# Patient Record
Sex: FEMALE | Race: WHITE | HISPANIC OR LATINO | Employment: OTHER | URBAN - METROPOLITAN AREA
[De-identification: names, ages, dates, MRNs, and addresses within clinical notes are randomized per-mention and may not be internally consistent; named-entity substitution may affect disease eponyms.]

---

## 2019-09-19 ENCOUNTER — OFFICE VISIT (OUTPATIENT)
Dept: MEDICAL GROUP | Facility: LAB | Age: 64
End: 2019-09-19
Payer: COMMERCIAL

## 2019-09-19 ENCOUNTER — HOSPITAL ENCOUNTER (OUTPATIENT)
Dept: LAB | Facility: MEDICAL CENTER | Age: 64
End: 2019-09-19
Attending: FAMILY MEDICINE
Payer: COMMERCIAL

## 2019-09-19 VITALS
RESPIRATION RATE: 16 BRPM | DIASTOLIC BLOOD PRESSURE: 78 MMHG | HEIGHT: 62 IN | TEMPERATURE: 98.8 F | WEIGHT: 185 LBS | BODY MASS INDEX: 34.04 KG/M2 | HEART RATE: 80 BPM | OXYGEN SATURATION: 98 % | SYSTOLIC BLOOD PRESSURE: 124 MMHG

## 2019-09-19 DIAGNOSIS — Z13.1 SCREENING FOR DIABETES MELLITUS: ICD-10-CM

## 2019-09-19 DIAGNOSIS — K44.9 HIATAL HERNIA: ICD-10-CM

## 2019-09-19 DIAGNOSIS — E03.9 ACQUIRED HYPOTHYROIDISM: ICD-10-CM

## 2019-09-19 DIAGNOSIS — K21.9 GASTROESOPHAGEAL REFLUX DISEASE WITHOUT ESOPHAGITIS: ICD-10-CM

## 2019-09-19 DIAGNOSIS — E78.5 DYSLIPIDEMIA: ICD-10-CM

## 2019-09-19 DIAGNOSIS — L30.9 ECZEMA, UNSPECIFIED TYPE: ICD-10-CM

## 2019-09-19 LAB
ALBUMIN SERPL BCP-MCNC: 4.5 G/DL (ref 3.2–4.9)
ALBUMIN/GLOB SERPL: 1.3 G/DL
ALP SERPL-CCNC: 88 U/L (ref 30–99)
ALT SERPL-CCNC: 21 U/L (ref 2–50)
ANION GAP SERPL CALC-SCNC: 7 MMOL/L (ref 0–11.9)
AST SERPL-CCNC: 21 U/L (ref 12–45)
BILIRUB SERPL-MCNC: 0.4 MG/DL (ref 0.1–1.5)
BUN SERPL-MCNC: 18 MG/DL (ref 8–22)
CALCIUM SERPL-MCNC: 9.6 MG/DL (ref 8.5–10.5)
CHLORIDE SERPL-SCNC: 104 MMOL/L (ref 96–112)
CHOLEST SERPL-MCNC: 217 MG/DL (ref 100–199)
CO2 SERPL-SCNC: 28 MMOL/L (ref 20–33)
CREAT SERPL-MCNC: 0.66 MG/DL (ref 0.5–1.4)
FASTING STATUS PATIENT QL REPORTED: NORMAL
GLOBULIN SER CALC-MCNC: 3.4 G/DL (ref 1.9–3.5)
GLUCOSE SERPL-MCNC: 84 MG/DL (ref 65–99)
HDLC SERPL-MCNC: 68 MG/DL
LDLC SERPL CALC-MCNC: 125 MG/DL
POTASSIUM SERPL-SCNC: 4 MMOL/L (ref 3.6–5.5)
PROT SERPL-MCNC: 7.9 G/DL (ref 6–8.2)
SODIUM SERPL-SCNC: 139 MMOL/L (ref 135–145)
T4 FREE SERPL-MCNC: 1.43 NG/DL (ref 0.53–1.43)
TRIGL SERPL-MCNC: 122 MG/DL (ref 0–149)
TSH SERPL DL<=0.005 MIU/L-ACNC: 1.71 UIU/ML (ref 0.38–5.33)

## 2019-09-19 PROCEDURE — 84443 ASSAY THYROID STIM HORMONE: CPT

## 2019-09-19 PROCEDURE — 84439 ASSAY OF FREE THYROXINE: CPT

## 2019-09-19 PROCEDURE — 99204 OFFICE O/P NEW MOD 45 MIN: CPT | Performed by: FAMILY MEDICINE

## 2019-09-19 PROCEDURE — 80053 COMPREHEN METABOLIC PANEL: CPT

## 2019-09-19 PROCEDURE — 80061 LIPID PANEL: CPT

## 2019-09-19 PROCEDURE — 36415 COLL VENOUS BLD VENIPUNCTURE: CPT

## 2019-09-19 RX ORDER — ATORVASTATIN CALCIUM 10 MG/1
5 TABLET, FILM COATED ORAL
COMMUNITY
Start: 2019-06-24 | End: 2020-09-28

## 2019-09-19 RX ORDER — LEVOTHYROXINE SODIUM 88 UG/1
88 TABLET ORAL DAILY
COMMUNITY
Start: 2019-06-24 | End: 2019-11-11 | Stop reason: SDUPTHER

## 2019-09-19 ASSESSMENT — PATIENT HEALTH QUESTIONNAIRE - PHQ9: CLINICAL INTERPRETATION OF PHQ2 SCORE: 0

## 2019-09-19 NOTE — LETTER
[employer] [facility]  Isabelle Swift M.D.  [address]  [place] [address]  Fax: [phone]   Authorization for Release/Disclosure of   Protected Health Information   Name: OGLDEN AYALA : 1955 SSN: [id number]   Address: [address] [place] [address] [address] [place] [address] Phone:    [phone] (home)    I authorize the entity listed below to release/disclose the PHI below to:   [facility]/Isabelle Swift M.D. and Isabelle Swift M.D.   Provider or Entity Name:     Address   City, State, [facility]   Phone:      Fax:     Reason for request: continuity of care   Information to be released:    [  ] LAST COLONOSCOPY,  including any PATH REPORT and follow-up  [  ] LAST FIT/COLOGUARD RESULT [  ] LAST DEXA  [  ] LAST MAMMOGRAM  [  ] LAST PAP  [  ] LAST LABS [  ] RETINA EXAM REPORT  [  ] IMMUNIZATION RECORDS  [  ] Release all info      [  ] Check here and initial the line next to each item to release ALL health information INCLUDING  _____ Care and treatment for drug and / or alcohol abuse  _____ HIV testing, infection status, or AIDS  _____ Genetic Testing    DATES OF SERVICE OR TIME PERIOD TO BE DISCLOSED: _____________  I understand and acknowledge that:  * This Authorization may be revoked at any time by you in writing, except if your health information has already been used or disclosed.  * Your health information that will be used or disclosed as a result of you signing this authorization could be re-disclosed by the recipient. If this occurs, your re-disclosed health information may no longer be protected by State or Federal laws.  * You may refuse to sign this Authorization. Your refusal will not affect your ability to obtain treatment.  * This Authorization becomes effective upon signing and will  on (date) __________.      If no date is indicated, this Authorization will  one (1) year from the signature date.    Name: Golden Aayla    Signature:   Date:     2019       PLEASE FAX REQUESTED  RECORDS BACK TO: [phone]

## 2019-09-19 NOTE — ASSESSMENT & PLAN NOTE
Dyslipidemia Chronic condition. Current treatments: diet/exercise/medicines. She is taking atorvastatin 5 mg 5 days a week as directed. Current side effects: none.

## 2019-09-23 ENCOUNTER — HOSPITAL ENCOUNTER (OUTPATIENT)
Dept: RADIOLOGY | Facility: MEDICAL CENTER | Age: 64
End: 2019-09-23

## 2019-09-25 NOTE — PROGRESS NOTES
Chief Complaint   Patient presents with   • Establish Care         Ana Maria Ayala is a 63 y.o. female here to establish care and for evaluation and management of:        HPI:    Dyslipidemia  Dyslipidemia Chronic condition. Current treatments: diet/exercise/medicines. She is taking atorvastatin 5 mg 5 days a week as directed. Current side effects: none.       Acquired hypothyroidism  Stable. Currently taking levothyroxine 88 mcg daily as prescribed.  Denies palpitations, skin changes, temperature intolerance, or changes in bowel habits        Eczema  Has a history of eczema but denies any current flares.  She moisturizes daily.      GERD (gastroesophageal reflux disease)  Patient has a history of GERD and hiatal hernia that she manages with dietary modifications.      Allergies   Allergen Reactions   • Codeine    • Morphine    • Septra [Sulfamethoxazole W-Trimethoprim]        Current medicines (including changes today)  Current Outpatient Medications   Medication Sig Dispense Refill   • atorvastatin (LIPITOR) 10 MG Tab Take 10 mg by mouth every day.     • levothyroxine (SYNTHROID) 88 MCG Tab Take 88 mcg by mouth every day.       No current facility-administered medications for this visit.      She  has a past medical history of Allergy, Arthritis, Eczema, GERD (gastroesophageal reflux disease), Hiatal hernia, Hyperlipidemia, Sinusitis, and Thyroid disease.  She  has a past surgical history that includes tonsillectomy and adenoidectomy; tubal coagulation laparoscopic bilateral; turbinate reduction (2014); and dental surgery.  Social History     Tobacco Use   • Smoking status: Never Smoker   • Smokeless tobacco: Never Used   Substance Use Topics   • Alcohol use: Not Currently     Comment: once in a bonny moon   • Drug use: Not Currently     Social History     Social History Narrative   • Not on file     Family History   Problem Relation Age of Onset   • Cancer Mother 44        breast   • Heart Disease Mother    •  "Hypertension Mother    • Lung Disease Mother    • Hyperlipidemia Mother    • Lung Disease Father    • Heart Disease Maternal Grandmother    • Diabetes Neg Hx      Family Status   Relation Name Status   • Mo 89    • Fa 88    • MGMo  (Not Specified)   • Neg Hx  (Not Specified)         ROS  No fever or chills.  No nausea or vomiting.  No chest pain or palpitations.  No cough or SOB.  No pain with urination or hematuria.  No black or bloody stools.  All other systems reviewed and are negative     Objective:     /78 (BP Location: Right arm, Patient Position: Sitting, BP Cuff Size: Adult)   Pulse 80   Temp 37.1 °C (98.8 °F) (Temporal)   Resp 16   Ht 1.575 m (5' 2\")   Wt 83.9 kg (185 lb)   SpO2 98%  Body mass index is 33.84 kg/m².  Physical Exam:      Well developed, well nourished.  Alert, oriented in no acute distress.  Psych: Eye contact is good, speech goal directed, affect calm  Eyes: conjunctiva non-injected, sclera non-icteric.  Neck Supple.  No adenopathy or masses in the neck or supraclavicular regions. No thyromegaly  Lungs: clear to auscultation bilaterally with good excursion. No wheezes or rhonchi  CV: regular rate and rhythm. No murmur  Abdomen: soft, nontender, no masses or organomegaly.  No rebound or guarding  Ext: no edema, color normal, vascularity normal, temperature normal      Assessment and Plan:   The following treatment plan was discussed    1. Hiatal hernia  Controlling with dietary modifications.  Encourage weight loss for better control.    2. Gastroesophageal reflux disease without esophagitis  Controlling with dietary modifications.  Encourage weight loss for better control.    3. Acquired hypothyroidism  Check thyroid labs and adjust levothyroxine dose as needed  - TSH; Future  - FREE THYROXINE; Future    4. Dyslipidemia  Check lipids and adjust atorvastatin as needed  - Comp Metabolic Panel; Future  - Lipid Profile; Future    5. Eczema, unspecified type  Patient " education materials given.  No current flares    6. Screening for diabetes mellitus  Screening labs ordered.  Await results for counseling.    - Comp Metabolic Panel; Future      Records requested.    Any change or worsening of signs or symptoms, patient encouraged to follow-up or report to the emergency room for further evaluation. Patient understands and agrees.    Followup: Return in about 6 months (around 3/19/2020).

## 2019-09-25 NOTE — ASSESSMENT & PLAN NOTE
Stable. Currently taking levothyroxine 88 mcg daily as prescribed.  Denies palpitations, skin changes, temperature intolerance, or changes in bowel habits

## 2019-10-01 ENCOUNTER — TELEPHONE (OUTPATIENT)
Dept: MEDICAL GROUP | Facility: LAB | Age: 64
End: 2019-10-01

## 2019-10-01 NOTE — TELEPHONE ENCOUNTER
· [employer] CD paperwork received fromDr. Terri Atkinson's office was sent to HIM to upload in patient's chart through inner office mail    · All appropriate fields completed by Medical Assistant: Yes

## 2019-10-03 ENCOUNTER — HOSPITAL ENCOUNTER (OUTPATIENT)
Dept: RADIOLOGY | Facility: MEDICAL CENTER | Age: 64
End: 2019-10-03
Attending: FAMILY MEDICINE
Payer: COMMERCIAL

## 2019-10-03 DIAGNOSIS — Z12.31 VISIT FOR SCREENING MAMMOGRAM: ICD-10-CM

## 2019-10-03 PROCEDURE — 77067 SCR MAMMO BI INCL CAD: CPT

## 2019-10-05 ENCOUNTER — TELEPHONE (OUTPATIENT)
Dept: HEALTH INFORMATION MANAGEMENT | Facility: OTHER | Age: 64
End: 2019-10-05

## 2019-10-05 DIAGNOSIS — Z12.11 SCREENING FOR COLON CANCER: ICD-10-CM

## 2019-10-05 NOTE — TELEPHONE ENCOUNTER
Good morning Dr. Swift,    This patient is overdue for health maintenance topics that need orders so she can complete them.     Please review the pended orders in  to sign or make adjustments as appropriate.    Close the encounter when complete.  If declining these orders, please document a reason and route to the Outreach MA pool (p AMB  Outreach).  I will call the patient to cancel the appointment.    Thank you

## 2019-10-08 ENCOUNTER — OFFICE VISIT (OUTPATIENT)
Dept: MEDICAL GROUP | Facility: LAB | Age: 64
End: 2019-10-08
Payer: COMMERCIAL

## 2019-10-08 ENCOUNTER — HOSPITAL ENCOUNTER (OUTPATIENT)
Facility: MEDICAL CENTER | Age: 64
End: 2019-10-08
Attending: FAMILY MEDICINE
Payer: COMMERCIAL

## 2019-10-08 VITALS
SYSTOLIC BLOOD PRESSURE: 118 MMHG | RESPIRATION RATE: 17 BRPM | OXYGEN SATURATION: 97 % | BODY MASS INDEX: 34.04 KG/M2 | HEIGHT: 62 IN | HEART RATE: 68 BPM | TEMPERATURE: 98.6 F | DIASTOLIC BLOOD PRESSURE: 72 MMHG | WEIGHT: 185 LBS

## 2019-10-08 DIAGNOSIS — Z01.419 WELL WOMAN EXAM WITH ROUTINE GYNECOLOGICAL EXAM: ICD-10-CM

## 2019-10-08 DIAGNOSIS — Z12.4 SCREENING FOR CERVICAL CANCER: ICD-10-CM

## 2019-10-08 DIAGNOSIS — Z23 NEED FOR VACCINATION: ICD-10-CM

## 2019-10-08 PROCEDURE — 90471 IMMUNIZATION ADMIN: CPT | Performed by: FAMILY MEDICINE

## 2019-10-08 PROCEDURE — 87624 HPV HI-RISK TYP POOLED RSLT: CPT

## 2019-10-08 PROCEDURE — 88175 CYTOPATH C/V AUTO FLUID REDO: CPT

## 2019-10-08 PROCEDURE — 90715 TDAP VACCINE 7 YRS/> IM: CPT | Performed by: FAMILY MEDICINE

## 2019-10-08 PROCEDURE — 99396 PREV VISIT EST AGE 40-64: CPT | Mod: 25 | Performed by: FAMILY MEDICINE

## 2019-10-08 NOTE — PATIENT INSTRUCTIONS
Pap Test  A Pap test is a procedure done in a clinic office to evaluate cells that are on the surface of the cervix. The cervix is the lower portion of the uterus and upper portion of the vagina. For some women, the cervical region has the potential to form cancer. With consistent evaluations by your caregiver, this type of cancer can be prevented.   If a Pap test is abnormal, it is most often a result of a previous exposure to human papillomavirus (HPV). HPV is a virus that can infect the cells of the cervix and cause dysplasia. Dysplasia is where the cells no longer look normal. If a woman has been diagnosed with high-grade or severe dysplasia, they are at higher risk of developing cervical cancer. People diagnosed with low-grade dysplasia should still be seen by their caregiver because there is a small chance that low-grade dysplasia could develop into cancer.   LET YOUR CAREGIVER KNOW ABOUT:  · Recent sexually transmitted infection (STI) you have had.  · Any new sex partners you have had.  · History of previous abnormal Pap tests results.  · History of previous cervical procedures you have had (colposcopy, biopsy, loop electrosurgical excision procedure [LEEP]).  · Concerns you have had regarding unusual vaginal discharge.  · History of pelvic pain.  · Your use of birth control.  BEFORE THE PROCEDURE  · Ask your caregiver when to schedule your Pap test. It is best not to be on your period if your caregiver uses a wooden spatula to collect cells or applies cells to a glass slide. Newer techniques are not so sensitive to the timing of a menstrual cycle.  · Do not douche or have sexual intercourse for 24 hours before the test.    · Do not use vaginal creams or tampons for 24 hours before the test.    · Empty your bladder just before the test to lessen any discomfort.    PROCEDURE  You will lie on an exam table with your feet in stirrups. A warm metal or plastic instrument (speculum) is placed in your vagina. This  instrument allows your caregiver to see the inside of your vagina and look at your cervix. A small, plastic brush or wooden spatula is then used to collect cervical cells. These cells are placed in a lab specimen container. The cells are looked at under a microscope. A specialist will determine if the cells are normal.   AFTER THE PROCEDURE  Make sure to get your test results. If your results come back abnormal, you may need further testing.   Document Released: 03/09/2004 Document Revised: 03/11/2013 Document Reviewed: 12/13/2012  [employer]® Patient Information ©2014 [employer].

## 2019-10-08 NOTE — PROGRESS NOTES
SUBJECTIVE:   Chief Complaint   Patient presents with   • Gynecologic Exam       63 y.o. female for annual routine gynecologic exam    OB History    Para Term  AB Living   1 1 0 0 0 1   SAB TAB Ectopic Molar Multiple Live Births   0 0 0 0 0 0   Obstetric Comments   32 yo son      Social History     Substance and Sexual Activity   Sexual Activity Yes   • Partners: Male       Last Pap: many years ago   HPV testing negative  H/O Abnormal Pap  No significant bloating/fluid retention, pelvic pain, or dyspareunia. No vaginal discharge   She does not perform regular self breast exams.  No breast tenderness, mass, nipple discharge, changes in size or contour, or abnormal cyclic discomfort.        ROS:    No urinary tract symptoms, no incontinence.   No abdominal pain, change in bowel habits, black or bloody stools.    No unusual headaches, no visual changes, menstrual migraines   No prolonged cough. No dyspnea or chest pain on exertion.  No depression, labile mood, anxiety ,libido changes, insomnia.  No new/concerning skin lesions,      Social History     Tobacco Use   • Smoking status: Never Smoker   • Smokeless tobacco: Never Used   Substance Use Topics   • Alcohol use: Not Currently     Comment: once in a bonny moon   • Drug use: Not Currently       Patient Active Problem List    Diagnosis Date Noted   • Acquired hypothyroidism 2019   • Dyslipidemia 2019   • Hiatal hernia    • GERD (gastroesophageal reflux disease)    • Eczema        Past Medical History:   Diagnosis Date   • Allergy    • Arthritis    • Eczema    • GERD (gastroesophageal reflux disease)    • Hiatal hernia    • Hyperlipidemia    • Sinusitis    • Thyroid disease      Past Surgical History:   Procedure Laterality Date   • TURBINATE REDUCTION     • DENTAL SURGERY     • TONSILLECTOMY AND ADENOIDECTOMY     • TUBAL COAGULATION LAPAROSCOPIC BILATERAL           Family History   Problem Relation Age of Onset   • Cancer Mother 44         "breast   • Heart Disease Mother    • Hypertension Mother    • Lung Disease Mother    • Hyperlipidemia Mother    • Lung Disease Father    • Heart Disease Maternal Grandmother    • Diabetes Neg Hx      Family Status   Relation Name Status   • Mo 89    • Fa 88    • MGMo  (Not Specified)   • Neg Hx  (Not Specified)         Current medicines (including changes today)  Current Outpatient Medications   Medication Sig Dispense Refill   • atorvastatin (LIPITOR) 10 MG Tab Take 5 mg by mouth every Mon, Wed, Fri, Sat, and Sun at 6 PM.     • levothyroxine (SYNTHROID) 88 MCG Tab Take 88 mcg by mouth every day.       No current facility-administered medications for this visit.            Allergies: Codeine; Morphine; and Septra [sulfamethoxazole w-trimethoprim]     Preventive Care:  Health Maintenance Topics with due status: Overdue       Topic Date Due    HEPATITIS C SCREENING 1956    PAP SMEAR 1977    COLONOSCOPY 2006    IMM DTaP/Tdap/Td Vaccine 2013    IMM INFLUENZA 2019       OBJECTIVE:   /72 (BP Location: Right arm, Patient Position: Sitting, BP Cuff Size: Adult)   Pulse 68   Temp 37 °C (98.6 °F) (Temporal)   Resp 17   Ht 1.575 m (5' 2\")   Wt 83.9 kg (185 lb)   LMP  (LMP Unknown)   SpO2 97%   BMI 33.84 kg/m²   Body mass index is 33.84 kg/m².      Gen: Well developed, well nourished in no acute distress.   Skin: Pink, warm, and dry. No rashes  Eyes: conjunctiva non-injected, sclera non-icteric. EOMs intact.   Nasal mucosa without edema nor erythema. No facial tenderness  Ears:Pinna normal. TM pearly gray.   Nose: Nares patent.  No discharge.  Oral mucous membranes pink and moist with no lesions.  Neck: Supple, trachea midline. No adenopathy or masses in the neck or supraclavicular regions. No thyromegaly.  Lungs: Effort is normal. Clear to auscultation bilaterally with good excursion.  CV: regular rate and rhythm. No murmur.  Abdomen: soft, nontender, + BS. No HSM.  " No CVAT  Ext: no edema, color normal, vascularity normal, temperature normal  Alert and oriented Eye contact is good, speech goal directed, affect calm     Breast Exam: Performed with instruction during examination. No axillary lymphadenopathy, no skin changes, no dominant masses. No nipple retraction  Pelvic Exam:  Normal external genitalia with no lesions. Normal vaginal mucosa with normal rugation and no discharge. Cervix with no visible lesions. No cervical motion tenderness. Uterus is normal sized with no masses. No adnexal tenderness or enlargement appreciated. Pap is obtained and the specimen was sent to lab      <ASSESSMENT and PLAN>  1. Well woman exam with routine gynecological exam  Routine anticipatory guidance.  Health counseling as below.    2. Screening for cervical cancer  Pap obtained.  - THINPREP PAP WITH HPV; Future    3. Need for vaccination  Updated  - Tdap Vaccine =>8YO IM      Discussed  breast self exam, menopause, osteoporosis, adequate intake of calcium and vitamin D, diet and exercise   .   Return in about 1 year (around 10/8/2020).

## 2019-10-09 ENCOUNTER — TELEPHONE (OUTPATIENT)
Dept: MEDICAL GROUP | Facility: LAB | Age: 64
End: 2019-10-09

## 2019-10-09 ENCOUNTER — NON-PROVIDER VISIT (OUTPATIENT)
Dept: MEDICAL GROUP | Facility: LAB | Age: 64
End: 2019-10-09
Payer: COMMERCIAL

## 2019-10-09 DIAGNOSIS — Z12.4 SCREENING FOR CERVICAL CANCER: ICD-10-CM

## 2019-10-09 DIAGNOSIS — Z23 NEED FOR VACCINATION: ICD-10-CM

## 2019-10-09 PROCEDURE — 90686 IIV4 VACC NO PRSV 0.5 ML IM: CPT | Performed by: FAMILY MEDICINE

## 2019-10-09 PROCEDURE — 90471 IMMUNIZATION ADMIN: CPT | Performed by: FAMILY MEDICINE

## 2019-10-09 NOTE — TELEPHONE ENCOUNTER
1. Caller Name: Ana Maria Ayala   Call Back Number: [phone] (home)         Patient approves a detailed voicemail message: N\A    Called and LVM informing patient we have the flu vaccine.

## 2019-10-09 NOTE — PROGRESS NOTES
"Ana Maria Ayala is a 63 y.o. female here for a non-provider visit for:   FLU    Reason for immunization: Annual Flu Vaccine  Immunization records indicate need for vaccine: Yes, confirmed with Epic  Minimum interval has been met for this vaccine: Yes  ABN completed: Not Indicated    Order and dose verified by: genet LAI Dated 8/15/19 was given to patient: Yes  All IAC Questionnaire questions were answered \"No.\"    Patient tolerated injection and no adverse effects were observed or reported: Yes    Pt scheduled for next dose in series: No  "

## 2019-10-10 LAB
CYTOLOGY REG CYTOL: NORMAL
HPV HR 12 DNA CVX QL NAA+PROBE: NEGATIVE
HPV16 DNA SPEC QL NAA+PROBE: NEGATIVE
HPV18 DNA SPEC QL NAA+PROBE: NEGATIVE
SPECIMEN SOURCE: NORMAL

## 2019-11-10 ENCOUNTER — PATIENT MESSAGE (OUTPATIENT)
Dept: MEDICAL GROUP | Facility: LAB | Age: 64
End: 2019-11-10

## 2019-11-11 RX ORDER — LEVOTHYROXINE SODIUM 88 UG/1
88 TABLET ORAL DAILY
Qty: 90 TAB | Refills: 0 | Status: SHIPPED | OUTPATIENT
Start: 2019-11-11 | End: 2020-02-11

## 2019-11-11 NOTE — TELEPHONE ENCOUNTER
From: Ana Maria Ayala  To: Isabelle Swift M.D.  Sent: 11/10/2019 8:18 AM PST  Subject: Prescription Question    I need a refill for my Levothyroxine 88 mcg and I am out of refills. I called in to [facility], but realized that they only have my old doctor's name for a refill. It is the [facility] at [address] [place] [address].

## 2019-11-11 NOTE — PATIENT COMMUNICATION
Was the patient seen in the last year in this department? Yes  LOV: 10/08/2019  Does patient have an active prescription for medications requested? Yes    Received Request Via: Patient

## 2020-02-25 ENCOUNTER — PATIENT MESSAGE (OUTPATIENT)
Dept: MEDICAL GROUP | Facility: LAB | Age: 65
End: 2020-02-25

## 2020-02-25 DIAGNOSIS — R13.13 PHARYNGEAL DYSPHAGIA: ICD-10-CM

## 2020-07-16 ENCOUNTER — OFFICE VISIT (OUTPATIENT)
Dept: MEDICAL GROUP | Facility: LAB | Age: 65
End: 2020-07-16
Payer: COMMERCIAL

## 2020-07-16 VITALS
HEIGHT: 62 IN | WEIGHT: 189 LBS | OXYGEN SATURATION: 80 % | TEMPERATURE: 98.6 F | HEART RATE: 79 BPM | BODY MASS INDEX: 34.78 KG/M2 | SYSTOLIC BLOOD PRESSURE: 120 MMHG | RESPIRATION RATE: 16 BRPM | DIASTOLIC BLOOD PRESSURE: 70 MMHG

## 2020-07-16 DIAGNOSIS — M75.22 BICEPS TENDONITIS OF BOTH SHOULDERS: ICD-10-CM

## 2020-07-16 DIAGNOSIS — M75.21 BICEPS TENDONITIS OF BOTH SHOULDERS: ICD-10-CM

## 2020-07-16 PROBLEM — M25.512 CHRONIC PAIN OF BOTH SHOULDERS: Status: ACTIVE | Noted: 2020-07-16

## 2020-07-16 PROBLEM — M25.511 CHRONIC PAIN OF BOTH SHOULDERS: Status: ACTIVE | Noted: 2020-07-16

## 2020-07-16 PROBLEM — G89.29 CHRONIC PAIN OF BOTH SHOULDERS: Status: ACTIVE | Noted: 2020-07-16

## 2020-07-16 PROCEDURE — 99214 OFFICE O/P EST MOD 30 MIN: CPT | Performed by: FAMILY MEDICINE

## 2020-07-16 ASSESSMENT — PATIENT HEALTH QUESTIONNAIRE - PHQ9: CLINICAL INTERPRETATION OF PHQ2 SCORE: 0

## 2020-07-16 NOTE — PATIENT INSTRUCTIONS
Proximal Biceps Tendon Tear Rehab  Ask your health care provider which exercises are safe for you. Do exercises exactly as told by your health care provider and adjust them as directed. It is normal to feel mild stretching, pulling, tightness, or discomfort as you do these exercises. Stop right away if you feel sudden pain or your pain gets worse. Do not begin these exercises until told by your health care provider.  Stretching and range-of-motion exercises  These exercises warm up your muscles and joints and improve the movement and flexibility of your arm and shoulder. The exercises also help to relieve pain and stiffness.  Shoulder pendulum    1. Stand near a table or counter that you can hold onto for balance.  2. Bend forward at the waist and let your left / right arm hang straight down. Use your other arm to support you and help you stay balanced.  3. Relax your left / right arm and shoulder muscles, and move your hips and your trunk so your left / right arm swings freely. Your arm should swing because of the motion of your body, not because you are using your arm or shoulder muscles.  4. Keep moving your hips and trunk so your arm swings in the following directions, as told by your health care provider:  ? Side to side.  ? Forward and backward.  ? In clockwise and counterclockwise circles.  Repeat __________ times. Complete this exercise __________ times a day.  Single-arm shoulder flexion, assisted  You will need a stick, broom handle, or similar object to help (assist) in doing this exercise.  1. Lie on your back. Grasp the bottom of the stick in your left / right hand.  2. Using the stick, raise your arm overhead until you feel a gentle stretch (flexion).  3. Hold this position for _________ seconds.  4. Return to the starting position.  Repeat __________ times. Complete this exercise __________ times a day.  Double-arm shoulder flexion, assisted  You will need a stick, broom handle, or similar object to  help (assist) in doing this exercise.  1. Lie on your back. Hold the stick in both hands, with your hands shoulder-width apart.  2. Raise both hands over your head, stopping when you feel a gentle stretch in your left / right shoulder (flexion).  3. Hold this position for _________ seconds.  4. Return to the starting position.  Repeat __________ times. Complete this exercise __________ times a day.  Shoulder flexion, assisted    1. Stand facing a wall. Put your left / right palm on the wall.  2. Slowly move your left / right hand up the wall (flexion). Stop when you feel a gentle stretch in your shoulder, or when you reach the angle that is recommended by your health care provider.  ? Use your other hand to help raise your arm, if needed (assisted).  ? As your hand gets higher, you may need to step closer to the wall.  ? Avoid shrugging or lifting your shoulder up as you raise your arm. To do this, keep your shoulder blade tucked down toward your spine.  3. Hold this position for __________ seconds.  4. Slowly return to the starting position. Use your other arm to help, if needed.  Repeat __________ times. Complete this exercise __________ times a day.  Shoulder abduction, assisted  You will need a stick, broom handle, or similar object to help you (assist) in doing this exercise.  1. Lie on your back. Grasp the bottom of the stick in your left / right hand. Your thumb should be pointing upward.  2. Using the stick, slowly push your arm away from your side (abduction) and over your head until you feel a gentle stretch.  3. Hold this position for __________ seconds.  4. Return to the starting position.  Repeat __________ times. Complete this exercise __________ times a day.  Elbow extension stretch  You may do this exercise in two ways:  · Lie on your back and rest your elbow off the edge of the bed.  · Sit at a table with your upper arm supported as if you are lying down, and let your elbow rest off the  table.  1. Let the weight of your hand, wrist, and forearm straighten your elbow (extension) until you feel a gentle stretch.  2. If approved by your health care provider, you may hold a _________ weight in your hand to help you stretch farther.  3. Hold this position for _________ seconds.  4. Slowly return to the starting position.  Repeat __________ times. Complete this exercise __________ times a day.  Strengthening exercises  These exercises build strength and endurance in your arm and shoulder. Endurance is the ability to use your muscles for a long time, even after they get tired. Do not start these exercises until instructed to do so by your health care provider.  Forearm rotation    1. Sit with your left / right forearm supported on a table. Your elbow should be at waist height.  2. If directed, hold a hammer in your left / right hand.  3. Rest your hand over the edge of the table so your palm faces down.  4. Without moving your left / right elbow, slowly rotate your hand so your palm faces up toward the ceiling.  ? If you are holding a hammer, begin by holding the hammer near the head. When this exercise gets easier for you, hold the hammer farther down the handle.  5. Hold this position for __________ seconds.  6. Slowly return to the starting position.  Repeat __________ times. Complete this exercise __________ times a day.  Isometric elbow flexion  1. Stand or sit with your left / right arm at waist height. Your palm should face in, toward your body.  2. Place your other hand on top of your left / right forearm. Gently push down while you resist with your left / right arm (isometric flexion).  ? Use about 50% effort with both arms. You may be instructed to use more and more effort with your arms each week.  ? Try not to let your left / right elbow move during the exercise.  3. Hold this position for _________ seconds.  4. Let your muscles relax completely before you repeat this exercise.  Repeat  __________ times. Complete this exercise __________ times a day.  Biceps curls  You may use a weight or an exercise band to do this exercise.  1. Sit on a stable chair without armrests, or stand up.  2. Hold a __________ weight in your left / right hand, or hold an exercise band with both hands. Your palms should face up toward the ceiling at the starting position.  3. Bend your left / right elbow and move your hand up toward your shoulder. Keep your other arm straight down, in the starting position.  4. Hold this position for __________ seconds.  5. Slowly return to the starting position.  Repeat __________ times. Complete this exercise __________ times a day.  Hammer curls  You may use a weight or an exercise band to do this exercise.  1. Sit on a stable chair without armrests, or stand up.  2. Hold a __________ weight in your left / right hand, or hold an exercise band with both hands. Your palms should face each other at the starting position.  3. Keeping your other arm straight, bend your left / right elbow and move your hand up toward your shoulder. Keep your elbow at your side while you bend it.  4. Slowly return to the starting position.  Repeat __________ times. Complete this exercise __________ times a day.  Isometric shoulder flexion  1. Stand facing a wall. Make a light fist and put your left / right hand on the wall, thumb toward the wall.  2. Push against the wall, trying to raise your arm straight in front of you (flexion). The wall will stop any motion (isometric flexion).  3. Hold this position for __________ seconds.  4. Let your arm muscles relax completely before you repeat this exercise.  Repeat __________ times. Complete this exercise __________ times a day.  Shoulder flexion  You may use a weight or an exercise band to do this exercise.  1. Stand holding a _________ weight in your left / right hand, or hold an exercise band in both hands.  2. Slowly raise your left / right arm overhead as far  as you can (flexion) without feeling pain.  ? Do not allow your shoulder to lift up while doing this exercise.  ? Keep your thumb pointing up to the ceiling.  3. Hold this position for __________ seconds.  4. Slowly return to the starting position.  Repeat __________ times. Complete this exercise __________ times a day.  Scapular retraction  Scapular retraction is the process of pulling the shoulder blades (scapulae) toward each other, and toward the spine. You will need an exercise band to do this exercise.  1. Sit in a stable chair without armrests, or stand up.  2. Secure an exercise band to a stable object in front of you so the band is at shoulder height.  3. Hold one end of the exercise band in each hand.  4. Squeeze your shoulder blades together and move your elbows slightly behind you (retraction). Do not shrug your shoulders upward while you do this.  5. Hold this position for __________ seconds.  6. Slowly return to the starting position.  Repeat __________ times. Complete this exercise __________ times a day.  Scapular protraction, supine  Scapular protraction is the process of moving your shoulder blades away from each other, and away from the spine, while you lie on your back (supine position).  1. Lie on your back on a firm surface. Hold a __________ weight in your left / right hand.  2. Raise your left / right arm straight into the air so your hand is directly above your shoulder joint.  3. Push the weight into the air so your shoulder (scapula) lifts off the surface that you are lying on. Think of trying to punch the ceiling by only moving your scapula forward (protraction). Do not move your head, neck, or back.  4. Hold this position for __________ seconds.  5. Slowly return to the starting position.  Repeat __________ times. Complete this exercise __________ times a day.  This information is not intended to replace advice given to you by your health care provider. Make sure you discuss any  questions you have with your health care provider.  Document Released: 12/18/2006 Document Revised: 04/07/2020 Document Reviewed: 12/30/2019  Elsevier Patient Education © 2020 [employer]  Proximal Biceps Tendinitis and Tenosynovitis    The proximal biceps tendon is a strong cord of tissue that connects the biceps muscle on the front of the upper arm to the shoulder blade. Tendinitis is inflammation of a tendon. Tenosynovitis is inflammation of the lining around the tendon (tendon sheath). These conditions often occur at the same time, and they can interfere with the ability to bend the elbow and turn the palm of the hand up.  Proximal biceps tendinitis and tenosynovitis are usually caused by overusing the shoulder joint and the biceps muscle. These conditions usually heal within 6 weeks. Proximal biceps tendinitis may include a grade 1 or grade 2 strain of the tendon.  · A grade 1 strain is mild, and it involves a slight pull of the tendon without any stretching or noticeable tearing of the tendon. There is usually no loss of biceps muscle strength.  · A grade 2 strain is moderate, and it involves a small tear in the tendon. The tendon is stretched, and biceps strength is usually decreased.  What are the causes?  This condition may be caused by:  · A sudden increase in frequency or intensity of activity that involves the shoulder and the biceps muscle.  · Overuse of the biceps muscle. This can happen when you do the same movements over and over, such as:  ? Turning the palm of the hand up.  ? Forceful straightening (hyperextension) of the elbow.  ? Bending the elbow.  · A direct, forceful hit or injury to the elbow. This is rare.  What increases the risk?  The following factors may make you more likely to develop this condition:  · Playing contact sports.  · Playing sports that involve throwing and overhead movements, including racket sports, gymnastics, weight lifting, or bodybuilding.  · Doing physical  labor.  · Having poor strength and flexibility of the arm and shoulder.  What are the signs or symptoms?  Symptoms of this condition may include:  · Pain and inflammation in the front of the shoulder.  · A feeling of warmth in the front of the shoulder.  · Limited range of motion of the shoulder and the elbow.  · A crackling sound (crepitation) when you move or touch the shoulder or the upper arm.  In some cases, symptoms may return after treatment, and they may be long-lasting (chronic).  How is this diagnosed?  This condition is diagnosed based on:  · Your symptoms.  · Your medical history.  · Physical exam.  · X-ray or MRI, if needed.  How is this treated?  Treatment for this condition depends on the severity of your injury. It may include:  · Resting the injured arm.  · Icing the injured area.  · Doing physical therapy.  Your health care provider may also use:  · Medicines to treat pain and inflammation.  · Sound waves to treat the injured muscle (ultrasound therapy).  · Medicines that are injected to the muscle (corticosteroids).  · Medicines that numb the area (local anesthetics).  · Surgery. This is done if other treatments have not worked.  Follow these instructions at home:  Managing pain, stiffness, and swelling         · If directed, put ice on the injured area.  ? Put ice in a plastic bag.  ? Place a towel between your skin and the bag.  ? Leave the ice on for 20 minutes, 2-3 times a day.  · If directed, apply heat to the affected area before you exercise. Use the heat source that your health care provider recommends, such as a moist heat pack or a heating pad.  ? Place a towel between your skin and the heat source.  ? Leave the heat on for 20-30 minutes.  ? Remove the heat if your skin turns bright red. This is especially important if you are unable to feel pain, heat, or cold. You may have a greater risk of getting burned.  · Move your fingers often to reduce stiffness and swelling.  · Raise (elevate)  the injured area above the level of your heart while you are lying down.  Activity  · Do not lift anything that is heavier than 10 lb (4.5 kg), or the limit that you are told, until your health care provider says that it is safe.  · Avoid activities that cause pain or make your condition worse.  · Return to your normal activities as told by your health care provider. Ask your health care provider what activities are safe for you.  · Do exercises as told by your health care provider.  General instructions  · Take over-the-counter and prescription medicines only as told by your health care provider.  · Do not use any products that contain nicotine or tobacco, such as cigarettes, e-cigarettes, and chewing tobacco. These can delay healing. If you need help quitting, ask your health care provider.  · Keep all follow-up visits as told by your health care provider. This is important.  How is this prevented?  · Warm up and stretch before being active.  · Cool down and stretch after being active.  · Give your body time to rest between periods of activity.  · Make sure any equipment that you use is fitted to you.  · Be safe and responsible while being active to avoid falls.  · Maintain physical fitness, including:  ? Strength.  ? Flexibility.  ? Heart health (cardiovascular fitness).  ? The ability to use muscles for a long time (endurance).  Contact a health care provider if:  · You have symptoms that get worse or do not get better after 2 weeks of treatment.  · You develop new symptoms.  Get help right away if:  · You develop severe pain.  Summary  · Tendinitis is inflammation of the biceps tendon. Tenosynovitis is inflammation of the lining around the biceps tendon. These conditions often occur at the same time.  · These conditions are usually caused by overusing the shoulder joint and biceps muscle.  · Symptoms include pain, warmth in the shoulder, and limited range of motion.  · The two conditions are treated with rest,  ice, medicines, and surgery (rare).  This information is not intended to replace advice given to you by your health care provider. Make sure you discuss any questions you have with your health care provider.  Document Released: 12/18/2006 Document Revised: 04/14/2020 Document Reviewed: 02/13/2020  Elsevier Patient Education © 2020 [employer]

## 2020-07-16 NOTE — ASSESSMENT & PLAN NOTE
She has had problems off and on for years but it seems to have worsened.  She is having pain in the upper arm up to the shoulder.  She is having some difficulty with elevation and reaching behind.  She denies any trauma to the area.  This is starting to affect her activities.

## 2020-07-22 NOTE — PROGRESS NOTES
"Subjective:     Chief Complaint   Patient presents with   • Shoulder Pain       Ana Maria Ayala is a 64 y.o. female here today for evaluation and management of:    Biceps tendonitis of both shoulders  She has had problems off and on for years but it seems to have worsened.  She is having pain in the upper arm up to the shoulder.  She is having some difficulty with elevation and reaching behind.  She denies any trauma to the area.  This is starting to affect her activities.       Allergies   Allergen Reactions   • Codeine    • Morphine    • Septra [Sulfamethoxazole W-Trimethoprim]        Current medicines (including changes today)  Current Outpatient Medications   Medication Sig Dispense Refill   • levothyroxine (SYNTHROID) 88 MCG Tab TAKE 1 TABLET BY MOUTH EVERY DAY 90 Tab 1   • atorvastatin (LIPITOR) 10 MG Tab Take 5 mg by mouth every Mon, Wed, Fri, Sat, and Sun at 6 PM.       No current facility-administered medications for this visit.        She  has a past medical history of Allergy, Arthritis, Eczema, GERD (gastroesophageal reflux disease), Hiatal hernia, Hyperlipidemia, Sinusitis, and Thyroid disease.    Patient Active Problem List    Diagnosis Date Noted   • Biceps tendonitis of both shoulders 07/16/2020   • Acquired hypothyroidism 09/19/2019   • Dyslipidemia 09/19/2019   • Hiatal hernia    • GERD (gastroesophageal reflux disease)    • Eczema        ROS   No fever or chills.  No nausea or vomiting.  No chest pain or palpitations.  No cough or SOB.  No pain with urination or hematuria.  No black or bloody stools.       Objective:     /70 (BP Location: Right arm, Patient Position: Sitting, BP Cuff Size: Adult)   Pulse 79   Temp 37 °C (98.6 °F) (Temporal)   Resp 16   Ht 1.575 m (5' 2\")   Wt 85.7 kg (189 lb)   SpO2 (!) 80%  Body mass index is 34.57 kg/m².   Physical Exam:  Well developed, well nourished.  Alert, oriented in no acute distress.  Eye contact is good, speech goal directed, affect " calm  Eyes: conjunctiva non-injected, sclera non-icteric.  Neck exam: No spinal tenderness to palpation. Normal flexion, extension and lateral rotation ROM. Spurling's test negative.   Shoulder/arm exam: No deformity, erythema, edema or ecchymosis. Tenderness to palpation proximal biceps insertion.  ROM intact but painful and elevation. Hawkin's test positive on the right. Neer's test positive on the left. . Empty can test negative. Drop arm test negative. Apprehension test negative. . 5/5 strength bilaterally.           Assessment and Plan:   The following treatment plan was discussed    1. Biceps tendonitis of both shoulders  Refer to physical therapy for further evaluation treatment.  If not improving refer to orthopedics.  Ibuprofen and ice as needed.  Patient education materials given including exercises.  - REFERRAL TO PHYSICAL THERAPY Reason for Therapy: Eval/Treat/Report    Any change or worsening of signs or symptoms, patient encouraged to follow-up or report to the emergency room for further evaluation. Patient understands and agrees.    Followup: Return if symptoms worsen or fail to improve.

## 2020-07-28 ENCOUNTER — APPOINTMENT (OUTPATIENT)
Dept: PHYSICAL THERAPY | Facility: MEDICAL CENTER | Age: 65
End: 2020-07-28
Attending: FAMILY MEDICINE
Payer: COMMERCIAL

## 2020-08-03 RX ORDER — LEVOTHYROXINE SODIUM 88 UG/1
TABLET ORAL
Qty: 90 TAB | Refills: 0 | Status: SHIPPED | OUTPATIENT
Start: 2020-08-03 | End: 2020-10-29 | Stop reason: SDUPTHER

## 2020-08-03 NOTE — TELEPHONE ENCOUNTER
Received request via: Pharmacy    Was the patient seen in the last year in this department? Yes  7/16/20  Does the patient have an active prescription (recently filled or refills available) for medication(s) requested? No

## 2020-08-07 ENCOUNTER — APPOINTMENT (OUTPATIENT)
Dept: PHYSICAL THERAPY | Facility: MEDICAL CENTER | Age: 65
End: 2020-08-07
Attending: FAMILY MEDICINE
Payer: COMMERCIAL

## 2020-08-12 ENCOUNTER — APPOINTMENT (OUTPATIENT)
Dept: PHYSICAL THERAPY | Facility: MEDICAL CENTER | Age: 65
End: 2020-08-12
Attending: FAMILY MEDICINE
Payer: COMMERCIAL

## 2020-08-14 ENCOUNTER — APPOINTMENT (OUTPATIENT)
Dept: PHYSICAL THERAPY | Facility: MEDICAL CENTER | Age: 65
End: 2020-08-14
Attending: FAMILY MEDICINE
Payer: COMMERCIAL

## 2020-08-19 ENCOUNTER — APPOINTMENT (OUTPATIENT)
Dept: PHYSICAL THERAPY | Facility: MEDICAL CENTER | Age: 65
End: 2020-08-19
Attending: FAMILY MEDICINE
Payer: COMMERCIAL

## 2020-08-21 ENCOUNTER — APPOINTMENT (OUTPATIENT)
Dept: PHYSICAL THERAPY | Facility: MEDICAL CENTER | Age: 65
End: 2020-08-21
Attending: FAMILY MEDICINE
Payer: COMMERCIAL

## 2020-10-07 ENCOUNTER — HOSPITAL ENCOUNTER (OUTPATIENT)
Dept: LAB | Facility: MEDICAL CENTER | Age: 65
End: 2020-10-07
Attending: FAMILY MEDICINE
Payer: COMMERCIAL

## 2020-10-07 DIAGNOSIS — Z13.1 SCREENING FOR DIABETES MELLITUS: ICD-10-CM

## 2020-10-07 DIAGNOSIS — E78.5 DYSLIPIDEMIA: ICD-10-CM

## 2020-10-07 DIAGNOSIS — E03.9 ACQUIRED HYPOTHYROIDISM: ICD-10-CM

## 2020-10-07 LAB
ALBUMIN SERPL BCP-MCNC: 3.9 G/DL (ref 3.2–4.9)
ALBUMIN/GLOB SERPL: 1 G/DL
ALP SERPL-CCNC: 99 U/L (ref 30–99)
ALT SERPL-CCNC: 20 U/L (ref 2–50)
ANION GAP SERPL CALC-SCNC: 10 MMOL/L (ref 7–16)
AST SERPL-CCNC: 18 U/L (ref 12–45)
BILIRUB SERPL-MCNC: 0.3 MG/DL (ref 0.1–1.5)
BUN SERPL-MCNC: 18 MG/DL (ref 8–22)
CALCIUM SERPL-MCNC: 9.5 MG/DL (ref 8.5–10.5)
CHLORIDE SERPL-SCNC: 102 MMOL/L (ref 96–112)
CHOLEST SERPL-MCNC: 211 MG/DL (ref 100–199)
CO2 SERPL-SCNC: 25 MMOL/L (ref 20–33)
CREAT SERPL-MCNC: 0.65 MG/DL (ref 0.5–1.4)
FASTING STATUS PATIENT QL REPORTED: NORMAL
GLOBULIN SER CALC-MCNC: 3.9 G/DL (ref 1.9–3.5)
GLUCOSE SERPL-MCNC: 93 MG/DL (ref 65–99)
HDLC SERPL-MCNC: 65 MG/DL
LDLC SERPL CALC-MCNC: 129 MG/DL
POTASSIUM SERPL-SCNC: 4.1 MMOL/L (ref 3.6–5.5)
PROT SERPL-MCNC: 7.8 G/DL (ref 6–8.2)
SODIUM SERPL-SCNC: 137 MMOL/L (ref 135–145)
T4 FREE SERPL-MCNC: 1.61 NG/DL (ref 0.93–1.7)
TRIGL SERPL-MCNC: 84 MG/DL (ref 0–149)
TSH SERPL DL<=0.005 MIU/L-ACNC: 1.06 UIU/ML (ref 0.38–5.33)

## 2020-10-07 PROCEDURE — 84439 ASSAY OF FREE THYROXINE: CPT

## 2020-10-07 PROCEDURE — 84443 ASSAY THYROID STIM HORMONE: CPT

## 2020-10-07 PROCEDURE — 80061 LIPID PANEL: CPT

## 2020-10-07 PROCEDURE — 36415 COLL VENOUS BLD VENIPUNCTURE: CPT

## 2020-10-07 PROCEDURE — 80053 COMPREHEN METABOLIC PANEL: CPT

## 2020-10-07 SDOH — ECONOMIC STABILITY: HOUSING INSECURITY
IN THE LAST 12 MONTHS, WAS THERE A TIME WHEN YOU DID NOT HAVE A STEADY PLACE TO SLEEP OR SLEPT IN A SHELTER (INCLUDING NOW)?: NO

## 2020-10-07 SDOH — HEALTH STABILITY: PHYSICAL HEALTH: ON AVERAGE, HOW MANY DAYS PER WEEK DO YOU ENGAGE IN MODERATE TO STRENUOUS EXERCISE (LIKE A BRISK WALK)?: 5 DAYS

## 2020-10-07 SDOH — ECONOMIC STABILITY: TRANSPORTATION INSECURITY
IN THE PAST 12 MONTHS, HAS THE LACK OF TRANSPORTATION KEPT YOU FROM MEDICAL APPOINTMENTS OR FROM GETTING MEDICATIONS?: NO

## 2020-10-07 SDOH — ECONOMIC STABILITY: TRANSPORTATION INSECURITY
IN THE PAST 12 MONTHS, HAS LACK OF RELIABLE TRANSPORTATION KEPT YOU FROM MEDICAL APPOINTMENTS, MEETINGS, WORK OR FROM GETTING THINGS NEEDED FOR DAILY LIVING?: NO

## 2020-10-07 SDOH — ECONOMIC STABILITY: INCOME INSECURITY: IN THE LAST 12 MONTHS, WAS THERE A TIME WHEN YOU WERE NOT ABLE TO PAY THE MORTGAGE OR RENT ON TIME?: NO

## 2020-10-07 SDOH — ECONOMIC STABILITY: HOUSING INSECURITY: IN THE LAST 12 MONTHS, HOW MANY PLACES HAVE YOU LIVED?: 1

## 2020-10-07 SDOH — HEALTH STABILITY: MENTAL HEALTH
STRESS IS WHEN SOMEONE FEELS TENSE, NERVOUS, ANXIOUS, OR CAN'T SLEEP AT NIGHT BECAUSE THEIR MIND IS TROUBLED. HOW STRESSED ARE YOU?: NOT AT ALL

## 2020-10-07 SDOH — HEALTH STABILITY: PHYSICAL HEALTH: ON AVERAGE, HOW MANY MINUTES DO YOU ENGAGE IN EXERCISE AT THIS LEVEL?: 30 MINUTES

## 2020-10-07 ASSESSMENT — SOCIAL DETERMINANTS OF HEALTH (SDOH)
WITHIN THE PAST 12 MONTHS, YOU WORRIED THAT YOUR FOOD WOULD RUN OUT BEFORE YOU GOT THE MONEY TO BUY MORE: NEVER TRUE
HOW OFTEN DO YOU GET TOGETHER WITH FRIENDS OR RELATIVES?: ONCE A WEEK
HOW OFTEN DO YOU ATTEND CHURCH OR RELIGIOUS SERVICES?: NEVER
HOW OFTEN DO YOU HAVE A DRINK CONTAINING ALCOHOL: NEVER
HOW HARD IS IT FOR YOU TO PAY FOR THE VERY BASICS LIKE FOOD, HOUSING, MEDICAL CARE, AND HEATING?: NOT HARD AT ALL
IN A TYPICAL WEEK, HOW MANY TIMES DO YOU TALK ON THE PHONE WITH FAMILY, FRIENDS, OR NEIGHBORS?: ONCE A WEEK
HOW OFTEN DO YOU HAVE SIX OR MORE DRINKS ON ONE OCCASION: NEVER
HOW OFTEN DO YOU ATTENT MEETINGS OF THE CLUB OR ORGANIZATION YOU BELONG TO?: MORE THAN 4 TIMES PER YEAR
DO YOU BELONG TO ANY CLUBS OR ORGANIZATIONS SUCH AS CHURCH GROUPS UNIONS, FRATERNAL OR ATHLETIC GROUPS, OR SCHOOL GROUPS?: YES
HOW MANY DRINKS CONTAINING ALCOHOL DO YOU HAVE ON A TYPICAL DAY WHEN YOU ARE DRINKING: DECLINE
WITHIN THE PAST 12 MONTHS, THE FOOD YOU BOUGHT JUST DIDN'T LAST AND YOU DIDN'T HAVE MONEY TO GET MORE: NEVER TRUE

## 2020-10-14 ENCOUNTER — OFFICE VISIT (OUTPATIENT)
Dept: MEDICAL GROUP | Facility: LAB | Age: 65
End: 2020-10-14
Payer: COMMERCIAL

## 2020-10-14 VITALS
HEART RATE: 64 BPM | SYSTOLIC BLOOD PRESSURE: 110 MMHG | TEMPERATURE: 97.7 F | OXYGEN SATURATION: 98 % | BODY MASS INDEX: 34.41 KG/M2 | WEIGHT: 187 LBS | HEIGHT: 62 IN | RESPIRATION RATE: 16 BRPM | DIASTOLIC BLOOD PRESSURE: 60 MMHG

## 2020-10-14 DIAGNOSIS — Z00.00 WELL ADULT EXAM: ICD-10-CM

## 2020-10-14 DIAGNOSIS — E66.09 CLASS 1 OBESITY DUE TO EXCESS CALORIES WITHOUT SERIOUS COMORBIDITY WITH BODY MASS INDEX (BMI) OF 34.0 TO 34.9 IN ADULT: ICD-10-CM

## 2020-10-14 DIAGNOSIS — E03.9 ACQUIRED HYPOTHYROIDISM: ICD-10-CM

## 2020-10-14 DIAGNOSIS — E78.5 DYSLIPIDEMIA: ICD-10-CM

## 2020-10-14 DIAGNOSIS — M25.561 RIGHT ANTERIOR KNEE PAIN: ICD-10-CM

## 2020-10-14 PROCEDURE — 99396 PREV VISIT EST AGE 40-64: CPT | Performed by: FAMILY MEDICINE

## 2020-10-14 RX ORDER — PANTOPRAZOLE SODIUM 20 MG/1
TABLET, DELAYED RELEASE ORAL
COMMUNITY
Start: 2020-08-27 | End: 2020-12-30

## 2020-10-14 NOTE — ASSESSMENT & PLAN NOTE
Dyslipidemia Chronic condition. Current treatments: diet/exercise/medicines. She was taking atorvastatin 10 mg twice a weeks but got a different generic and is now taking 5 mg daily but just for the last 2 weeks. Current side effects: none.   Results for GOLDEN KENYON (MRN [id number]) as of 10/14/2020 07:15   Ref. Range 9/19/2019 09:04 9/23/2019 14:41 9/23/2019 14:42 10/3/2019 14:21 10/8/2019 00:00 10/8/2019 08:19 10/7/2020 07:44   Cholesterol,Tot Latest Ref Range: 100 - 199 mg/dL 217 (H)      211 (H)   Triglycerides Latest Ref Range: 0 - 149 mg/dL 122      84   HDL Latest Ref Range: >=40 mg/dL 68      65   LDL Latest Ref Range: <100 mg/dL 125 (H)      129 (H)

## 2020-10-14 NOTE — PROGRESS NOTES
SUBJECTIVE:   Chief Complaint   Patient presents with   • Annual Exam   • Knee Pain     right        64 y.o. female for annual routine exam  Dyslipidemia  Dyslipidemia Chronic condition. Current treatments: diet/exercise/medicines. She was taking atorvastatin 10 mg twice a weeks but got a different generic and is now taking 5 mg daily but just for the last 2 weeks. Current side effects: none.   Results for GOLDEN KENYON (MRN [id number]) as of 10/14/2020 07:15   Ref. Range 2019 09:04 2019 14:41 2019 14:42 10/3/2019 14:21 10/8/2019 00:00 10/8/2019 08:19 10/7/2020 07:44   Cholesterol,Tot Latest Ref Range: 100 - 199 mg/dL 217 (H)      211 (H)   Triglycerides Latest Ref Range: 0 - 149 mg/dL 122      84   HDL Latest Ref Range: >=40 mg/dL 68      65   LDL Latest Ref Range: <100 mg/dL 125 (H)      129 (H)         Right anterior knee pain  Pain in right anterior knee pain for the last 2 weeks.  She denies any injury to the area.  She is having locking.  She likes to sit with her legs extended and then it feels like it will not move.  She not had an x-ray in many years.    Class 1 obesity due to excess calories without serious comorbidity with body mass index (BMI) of 34.0 to 34.9 in adult  Patient is exercising 2-5 times a week.  She would like a referral to weight management.    Acquired hypothyroidism  Stable. Currently taking levothyroxine 88 mcg daily as prescribed.  Denies palpitations, skin changes, temperature intolerance, or changes in bowel habits            OB History    Para Term  AB Living   1 1 0 0 0 1   SAB TAB Ectopic Molar Multiple Live Births   0 0 0 0 0 0   Obstetric Comments   32 yo son      Social History     Substance and Sexual Activity   Sexual Activity Yes   • Partners: Male         ROS:    No urinary tract symptoms, no incontinence.   No abdominal pain, change in bowel habits, black or bloody stools.    No unusual headaches, no visual changes, menstrual migraines   No  prolonged cough. No dyspnea or chest pain on exertion.  No depression, labile mood, anxiety ,libido changes, insomnia.  No new/concerning skin lesions,       Social History     Tobacco Use   • Smoking status: Never Smoker   • Smokeless tobacco: Never Used   Substance Use Topics   • Alcohol use: Not Currently     Frequency: Never     Drinks per session: Patient refused     Binge frequency: Never     Comment: once in a bonny moon   • Drug use: Not Currently       Patient Active Problem List    Diagnosis Date Noted   • Right anterior knee pain 10/14/2020   • Class 1 obesity due to excess calories without serious comorbidity with body mass index (BMI) of 34.0 to 34.9 in adult 10/14/2020   • Biceps tendonitis of both shoulders 2020   • Acquired hypothyroidism 2019   • Dyslipidemia 2019   • Hiatal hernia    • GERD (gastroesophageal reflux disease)    • Eczema        Past Medical History:   Diagnosis Date   • Allergy    • Arthritis    • Eczema    • GERD (gastroesophageal reflux disease)    • Hiatal hernia    • Hyperlipidemia    • Sinusitis    • Thyroid disease      Past Surgical History:   Procedure Laterality Date   • TURBINATE REDUCTION     • DENTAL SURGERY     • TONSILLECTOMY AND ADENOIDECTOMY     • TUBAL COAGULATION LAPAROSCOPIC BILATERAL           Family History   Problem Relation Age of Onset   • Cancer Mother 44        breast   • Heart Disease Mother    • Hypertension Mother    • Lung Disease Mother    • Hyperlipidemia Mother    • Lung Disease Father    • Heart Disease Maternal Grandmother    • Diabetes Neg Hx      Family Status   Relation Name Status   • Mo 89    • Fa 88    • MGMo  (Not Specified)   • Neg Hx  (Not Specified)         Current medicines (including changes today)  Current Outpatient Medications   Medication Sig Dispense Refill   • pantoprazole (PROTONIX) 20 MG tablet TK 1 T PO QD 30 MIN B JOYCE AND DINNER MEAL     • atorvastatin (LIPITOR) 10 MG Tab TAKE 1/2 TABLET BY  "MOUTH EVERY NIGHT AT BEDTIME 45 Tab 0   • levothyroxine (SYNTHROID) 88 MCG Tab TAKE 1 TABLET BY MOUTH EVERY DAY 90 Tab 0     No current facility-administered medications for this visit.            Allergies: Codeine, Hydrocodone, Morphine, and Septra [sulfamethoxazole w-trimethoprim]     Preventive Care:  Health Maintenance Topics with due status: Overdue       Topic Date Due    HEPATITIS C SCREENING 1955    MAMMOGRAM 10/03/2020       OBJECTIVE:   /60 (BP Location: Right arm, Patient Position: Sitting, BP Cuff Size: Adult)   Pulse 64   Temp 36.5 °C (97.7 °F)   Resp 16   Ht 1.575 m (5' 2\")   Wt 84.8 kg (187 lb)   LMP  (LMP Unknown)   SpO2 98%   BMI 34.20 kg/m²   Body mass index is 34.2 kg/m².      Gen: Well developed, well nourished in no acute distress.   Skin: Pink, warm, and dry. No rashes  Eyes: conjunctiva non-injected, sclera non-icteric. EOMs intact.   Nasal mucosa without edema nor erythema. No facial tenderness  Ears:Pinna normal. TM pearly gray.   Neck: Supple, trachea midline. No adenopathy or masses in the neck or supraclavicular regions. No thyromegaly.  Lungs: Effort is normal. Clear to auscultation bilaterally with good excursion.  CV: regular rate and rhythm. No murmur.  Abdomen: soft, nontender, + BS. No HSM.  No CVAT  Ext: no edema, color normal, vascularity normal, temperature normal  Alert and oriented Eye contact is good, speech goal directed, affect calm   Knees: No erythema/edema/ecchymosis/effusion. Tenderness to palpation of right LCL. Joint line tenderness laterally on the right. Patellar grind test negative. Lachman's negative. . ROM intact. 5/5 strength bilaterally. 2+ deep tendon reflexes bilaterally.        <ASSESSMENT and PLAN>  1. Well adult exam     2. Acquired hypothyroidism     3. Dyslipidemia     4. Right anterior knee pain  DX-KNEE 3 VIEWS RIGHT   5. Class 1 obesity due to excess calories without serious comorbidity with body mass index (BMI) of 34.0 to 34.9 in " adult  REFERRAL TO [facility] IMPROVEMENT PROGRAMS (HIP) Services Requested: Physician Medical Weight Management Program; Reason for Referral? BMI>30; Reason for Visit: Overweight/Obesity       Discussed  breast self exam, mammography screening, osteoporosis, adequate intake of calcium and vitamin D, diet and exercise, colorectal cancer screening      Return in about 6 months (around 4/14/2021) for labs.

## 2020-10-14 NOTE — ASSESSMENT & PLAN NOTE
Pain in right anterior knee pain for the last 2 weeks.  She denies any injury to the area.  She is having locking.  She likes to sit with her legs extended and then it feels like it will not move.  She not had an x-ray in many years.

## 2020-10-19 ENCOUNTER — HOSPITAL ENCOUNTER (OUTPATIENT)
Dept: RADIOLOGY | Facility: MEDICAL CENTER | Age: 65
End: 2020-10-19
Attending: FAMILY MEDICINE
Payer: COMMERCIAL

## 2020-10-19 DIAGNOSIS — M25.561 RIGHT ANTERIOR KNEE PAIN: ICD-10-CM

## 2020-10-19 DIAGNOSIS — Z12.31 VISIT FOR SCREENING MAMMOGRAM: ICD-10-CM

## 2020-10-19 PROCEDURE — 77067 SCR MAMMO BI INCL CAD: CPT

## 2020-10-19 PROCEDURE — 73562 X-RAY EXAM OF KNEE 3: CPT | Mod: RT

## 2020-10-29 RX ORDER — LEVOTHYROXINE SODIUM 88 UG/1
88 TABLET ORAL
Qty: 90 TAB | Refills: 3 | Status: SHIPPED | OUTPATIENT
Start: 2020-10-29 | End: 2021-09-15

## 2020-11-23 ENCOUNTER — HOSPITAL ENCOUNTER (OUTPATIENT)
Dept: RADIOLOGY | Facility: MEDICAL CENTER | Age: 65
End: 2020-11-23
Attending: NURSE PRACTITIONER
Payer: COMMERCIAL

## 2020-11-23 DIAGNOSIS — K21.9 GASTROESOPHAGEAL REFLUX DISEASE, UNSPECIFIED WHETHER ESOPHAGITIS PRESENT: ICD-10-CM

## 2020-11-23 DIAGNOSIS — R13.19 OTHER DYSPHAGIA: ICD-10-CM

## 2020-11-23 DIAGNOSIS — K44.9 HIATAL HERNIA: ICD-10-CM

## 2020-11-23 PROCEDURE — 700117 HCHG RX CONTRAST REV CODE 255: Performed by: NURSE PRACTITIONER

## 2020-11-23 PROCEDURE — 74220 X-RAY XM ESOPHAGUS 1CNTRST: CPT

## 2020-11-23 RX ADMIN — BARIUM SULFATE 700 MG: 700 TABLET ORAL at 10:35

## 2020-11-25 ENCOUNTER — PATIENT MESSAGE (OUTPATIENT)
Dept: MEDICAL GROUP | Facility: LAB | Age: 65
End: 2020-11-25

## 2020-11-25 DIAGNOSIS — Z13.0 SCREENING FOR DEFICIENCY ANEMIA: ICD-10-CM

## 2020-12-09 ENCOUNTER — OFFICE VISIT (OUTPATIENT)
Dept: MEDICAL GROUP | Facility: LAB | Age: 65
End: 2020-12-09
Payer: COMMERCIAL

## 2020-12-09 VITALS
TEMPERATURE: 98 F | SYSTOLIC BLOOD PRESSURE: 110 MMHG | DIASTOLIC BLOOD PRESSURE: 64 MMHG | RESPIRATION RATE: 16 BRPM | BODY MASS INDEX: 34.41 KG/M2 | HEIGHT: 62 IN | HEART RATE: 72 BPM | WEIGHT: 187 LBS | OXYGEN SATURATION: 97 %

## 2020-12-09 DIAGNOSIS — E03.9 ACQUIRED HYPOTHYROIDISM: ICD-10-CM

## 2020-12-09 DIAGNOSIS — L30.9 ECZEMA, UNSPECIFIED TYPE: ICD-10-CM

## 2020-12-09 DIAGNOSIS — R77.1 ELEVATED SERUM GLOBULIN LEVEL: ICD-10-CM

## 2020-12-09 DIAGNOSIS — E66.09 CLASS 1 OBESITY DUE TO EXCESS CALORIES WITHOUT SERIOUS COMORBIDITY WITH BODY MASS INDEX (BMI) OF 34.0 TO 34.9 IN ADULT: ICD-10-CM

## 2020-12-09 DIAGNOSIS — Z13.0 SCREENING FOR DEFICIENCY ANEMIA: ICD-10-CM

## 2020-12-09 DIAGNOSIS — E78.5 DYSLIPIDEMIA: ICD-10-CM

## 2020-12-09 PROCEDURE — 99214 OFFICE O/P EST MOD 30 MIN: CPT | Performed by: FAMILY MEDICINE

## 2020-12-09 RX ORDER — TRIAMCINOLONE ACETONIDE 1 MG/G
CREAM TOPICAL
Qty: 45 G | Refills: 1 | Status: SHIPPED | OUTPATIENT
Start: 2020-12-09 | End: 2021-10-05 | Stop reason: SDUPTHER

## 2020-12-09 NOTE — PROGRESS NOTES
Subjective:     Chief Complaint   Patient presents with   • Requesting Labs     for Weight Management group and EKG   • Rash     chest       Ana Maria Ayala is a 64 y.o. female here today for evaluation and management of:    Patient is planning to [facility].  They require an EKG within 6 months of the visit and labs within 1 month of the visit.  Patient did have labs in October which she will check and see if they take but I have ordered all the requested labs.  Patient has a history of hypothyroidism and dyslipidemia.  She also had an elevated globulin level on her last labs which we will recheck.  She reports she has had a flare of her eczema on her chest over the last couple weeks.  She is using OTC cortisone cream which has helped some but it is very itchy.  She denies any new contacts.    Allergies   Allergen Reactions   • Codeine    • Hydrocodone Unspecified   • Morphine    • Septra [Sulfamethoxazole W-Trimethoprim]        Current medicines (including changes today)  Current Outpatient Medications   Medication Sig Dispense Refill   • triamcinolone acetonide (KENALOG) 0.1 % Cream Apply thin film to affected areas BID until clear 45 g 1   • levothyroxine (SYNTHROID) 88 MCG Tab Take 1 Tab by mouth every day. 90 Tab 3   • pantoprazole (PROTONIX) 20 MG tablet TK 1 T PO QD 30 MIN B JOYCE AND DINNER MEAL     • atorvastatin (LIPITOR) 10 MG Tab TAKE 1/2 TABLET BY MOUTH EVERY NIGHT AT BEDTIME 45 Tab 0     No current facility-administered medications for this visit.        She  has a past medical history of Allergy, Arthritis, Eczema, GERD (gastroesophageal reflux disease), Hiatal hernia, Hyperlipidemia, Sinusitis, and Thyroid disease.    Patient Active Problem List    Diagnosis Date Noted   • Right anterior knee pain 10/14/2020   • Class 1 obesity due to excess calories without serious comorbidity with body mass index (BMI) of 34.0 to 34.9 in adult 10/14/2020   • Biceps tendonitis of both  "shoulders 07/16/2020   • Acquired hypothyroidism 09/19/2019   • Dyslipidemia 09/19/2019   • Hiatal hernia    • GERD (gastroesophageal reflux disease)    • Eczema        ROS   No fever or chills.  No nausea or vomiting.  No chest pain or palpitations.  No cough or SOB.  No pain with urination or hematuria.  No black or bloody stools.       Objective:     /64 (BP Location: Right arm, Patient Position: Sitting, BP Cuff Size: Adult)   Pulse 72   Temp 36.7 °C (98 °F) (Temporal)   Resp 16   Ht 1.575 m (5' 2\")   Wt 84.8 kg (187 lb)   SpO2 97%  Body mass index is 34.2 kg/m².   Physical Exam:  Well developed, well nourished.  Alert, oriented in no acute distress.  Eye contact is good, speech goal directed, affect calm  Eyes: conjunctiva non-injected, sclera non-icteric.  Neck Supple.  No adenopathy or masses in the neck or supraclavicular regions. No thyromegaly  Lungs: clear to auscultation bilaterally with good excursion. No wheezes or rhonchi  CV: regular rate and rhythm. No murmur  Skin erythematous excoriated rash on the upper chest bilaterally and between the breasts   EKG Interpretation-HR is 66 normal sinus rhythm, left axis deviation              Assessment and Plan:   The following treatment plan was discussed    1. Class 1 obesity due to excess calories without serious comorbidity with body mass index (BMI) of 34.0 to 34.9 in adult  Check labs per weight management profile.  Patient will check with them and see if they will accept her October labs.  If not she is seeing them in early January and will get them in a couple weeks.  Mediterranean diet information given  - CBC WITH DIFFERENTIAL; Future  - Comp Metabolic Panel; Future  - Lipid Profile; Future  - TSH; Future  - URINALYSIS,CULTURE IF INDICATED; Future  - EKG; Future    2. Dyslipidemia  Check lipids and thyroid labs.  EKG secondary to risk of heart disease  - Lipid Profile; Future  - TSH; Future  - EKG; Future    3. Acquired " hypothyroidism  Continue current dose.  Recheck TSH  - TSH; Future    4. Elevated serum globulin level  Repeat globulin level.  If it remains high consider getting a protein electrophoresis  - Comp Metabolic Panel; Future    5. Screening for deficiency anemia  Screening labs ordered.  Await results for counseling.    - CBC WITH DIFFERENTIAL; Future    6. Eczema, unspecified type  Triamcinolone twice a day until clear.  - triamcinolone acetonide (KENALOG) 0.1 % Cream; Apply thin film to affected areas BID until clear  Dispense: 45 g; Refill: 1    Any change or worsening of signs or symptoms, patient encouraged to follow-up or report to the emergency room for further evaluation. Patient understands and agrees.    Followup: Return in about 1 year (around 12/9/2021).

## 2020-12-09 NOTE — PATIENT INSTRUCTIONS
Mediterranean Diet  A Mediterranean diet refers to food and lifestyle choices that are based on the traditions of countries located on the [place]. This way of eating has been shown to help prevent certain conditions and improve outcomes for people who have chronic diseases, like kidney disease and heart disease.  What are tips for following this plan?  Lifestyle  · Cook and eat meals together with your family, when possible.  · Drink enough fluid to keep your urine clear or pale yellow.  · Be physically active every day. This includes:  ? Aerobic exercise like running or swimming.  ? Leisure activities like gardening, walking, or housework.  · Get 7-8 hours of sleep each night.  · If recommended by your health care provider, drink red wine in moderation. This means 1 glass a day for nonpregnant women and 2 glasses a day for men. A glass of wine equals 5 oz (150 mL).  Reading food labels    · Check the serving size of packaged foods. For foods such as rice and pasta, the serving size refers to the amount of cooked product, not dry.  · Check the total fat in packaged foods. Avoid foods that have saturated fat or trans fats.  · Check the ingredients list for added sugars, such as corn syrup.  Shopping  · At the grocery store, buy most of your food from the areas near the walls of the store. This includes:  ? Fresh fruits and vegetables (produce).  ? Grains, beans, nuts, and seeds. Some of these may be available in unpackaged forms or large amounts (in bulk).  ? Fresh seafood.  ? Poultry and eggs.  ? Low-fat dairy products.  · Buy whole ingredients instead of prepackaged foods.  · Buy fresh fruits and vegetables in-season from local farmers markets.  · Buy frozen fruits and vegetables in resealable bags.  · If you do not have access to quality fresh seafood, buy precooked frozen shrimp or canned fish, such as tuna, salmon, or sardines.  · Buy small amounts of raw or cooked vegetables, salads, or olives from  the deli or salad bar at your store.  · Stock your pantry so you always have certain foods on hand, such as olive oil, canned tuna, canned tomatoes, rice, pasta, and beans.  Cooking  · Cook foods with extra-virgin olive oil instead of using butter or other vegetable oils.  · Have meat as a side dish, and have vegetables or grains as your main dish. This means having meat in small portions or adding small amounts of meat to foods like pasta or stew.  · Use beans or vegetables instead of meat in common dishes like chili or lasagna.  · [place] with different cooking methods. Try roasting or broiling vegetables instead of steaming or sautéeing them.  · Add frozen vegetables to soups, stews, pasta, or rice.  · Add nuts or seeds for added healthy fat at each meal. You can add these to yogurt, salads, or vegetable dishes.  · Marinate fish or vegetables using olive oil, lemon juice, garlic, and fresh herbs.  Meal planning    · Plan to eat 1 vegetarian meal one day each week. Try to work up to 2 vegetarian meals, if possible.  · Eat seafood 2 or more times a week.  · Have healthy snacks readily available, such as:  ? Vegetable sticks with hummus.  ? Greek yogurt.  ? Fruit and nut trail mix.  · Eat balanced meals throughout the week. This includes:  ? Fruit: 2-3 servings a day  ? Vegetables: 4-5 servings a day  ? Low-fat dairy: 2 servings a day  ? Fish, poultry, or lean meat: 1 serving a day  ? Beans and legumes: 2 or more servings a week  ? Nuts and seeds: 1-2 servings a day  ? Whole grains: 6-8 servings a day  ? Extra-virgin olive oil: 3-4 servings a day  · Limit red meat and sweets to only a few servings a month  What are my food choices?  · Mediterranean diet  ? Recommended  § Grains: Whole-grain pasta. Brown rice. Bulgar wheat. Polenta. Couscous. Whole-wheat bread. Oatmeal. Quinoa.  § Vegetables: Artichokes. Beets. Broccoli. Cabbage. Carrots. Eggplant. Green beans. Chard. Kale. Spinach. Onions. Leeks. Peas. Squash.  Tomatoes. Peppers. Radishes.  § Fruits: Apples. Apricots. Avocado. Berries. Bananas. Cherries. Dates. Figs. Grapes. Jose. Melon. Oranges. Peaches. Plums. Pomegranate.  § Meats and other protein foods: Beans. Almonds. Sunflower seeds. Pine nuts. Peanuts. Cod. [place]. Scallops. Shrimp. Tuna. Tilapia. Clams. Oysters. Eggs.  § Dairy: Low-fat milk. Cheese. Greek yogurt.  § Beverages: Water. Red wine. Herbal tea.  § Fats and oils: Extra virgin olive oil. Avocado oil. Grape seed oil.  § Sweets and desserts: Greek yogurt with honey. Baked apples. Poached pears. Trail mix.  § Seasoning and other foods: Basil. Cilantro. Coriander. Cumin. Mint. Parsley. Kehinde. Rosemary. Tarragon. Garlic. Oregano. Thyme. Pepper. Balsalmic vinegar. Tahini. Hummus. Tomato sauce. Olives. Mushrooms.  ? Limit these  § Grains: Prepackaged pasta or rice dishes. Prepackaged cereal with added sugar.  § Vegetables: Deep fried potatoes (french fries).  § Fruits: Fruit canned in syrup.  § Meats and other protein foods: Beef. Pork. Lamb. Poultry with skin. Hot dogs. [place].  § Dairy: Ice cream. Sour cream. Whole milk.  § Beverages: Juice. Sugar-sweetened soft drinks. Beer. Liquor and spirits.  § Fats and oils: Butter. Canola oil. Vegetable oil. Beef fat (tallow). Lard.  § Sweets and desserts: Cookies. Cakes. Pies. Candy.  § Seasoning and other foods: Mayonnaise. Premade sauces and marinades.  The items listed may not be a complete list. Talk with your dietitian about what dietary choices are right for you.  Summary  · The Mediterranean diet includes both food and lifestyle choices.  · Eat a variety of fresh fruits and vegetables, beans, nuts, seeds, and whole grains.  · Limit the amount of red meat and sweets that you eat.  · Talk with your health care provider about whether it is safe for you to drink red wine in moderation. This means 1 glass a day for nonpregnant women and 2 glasses a day for men. A glass of wine equals 5 oz (150 mL).  This information  is not intended to replace advice given to you by your health care provider. Make sure you discuss any questions you have with your health care provider.  Document Released: 08/10/2017 Document Revised: 08/17/2017 Document Reviewed: 08/10/2017  Elsevier Patient Education © 2020 [employer]

## 2020-12-19 DIAGNOSIS — E78.5 DYSLIPIDEMIA: ICD-10-CM

## 2020-12-21 RX ORDER — ATORVASTATIN CALCIUM 10 MG/1
TABLET, FILM COATED ORAL
Qty: 45 TAB | Refills: 0 | Status: SHIPPED | OUTPATIENT
Start: 2020-12-21 | End: 2021-03-18 | Stop reason: SDUPTHER

## 2020-12-21 NOTE — TELEPHONE ENCOUNTER
Received request via: Pharmacy    Was the patient seen in the last year in this department? Yes  12/9/202  Does the patient have an active prescription (recently filled or refills available) for medication(s) requested? No

## 2020-12-28 ENCOUNTER — HOSPITAL ENCOUNTER (OUTPATIENT)
Dept: LAB | Facility: MEDICAL CENTER | Age: 65
End: 2020-12-28
Attending: FAMILY MEDICINE
Payer: COMMERCIAL

## 2020-12-28 DIAGNOSIS — Z13.0 SCREENING FOR DEFICIENCY ANEMIA: ICD-10-CM

## 2020-12-28 DIAGNOSIS — E66.09 CLASS 1 OBESITY DUE TO EXCESS CALORIES WITHOUT SERIOUS COMORBIDITY WITH BODY MASS INDEX (BMI) OF 34.0 TO 34.9 IN ADULT: ICD-10-CM

## 2020-12-28 LAB
APPEARANCE UR: CLEAR
BASOPHILS # BLD AUTO: 0.6 % (ref 0–1.8)
BASOPHILS # BLD: 0.04 K/UL (ref 0–0.12)
BILIRUB UR QL STRIP.AUTO: NEGATIVE
COLOR UR: YELLOW
EOSINOPHIL # BLD AUTO: 0.28 K/UL (ref 0–0.51)
EOSINOPHIL NFR BLD: 4.1 % (ref 0–6.9)
ERYTHROCYTE [DISTWIDTH] IN BLOOD BY AUTOMATED COUNT: 45.2 FL (ref 35.9–50)
GLUCOSE UR STRIP.AUTO-MCNC: NEGATIVE MG/DL
HCT VFR BLD AUTO: 41.8 % (ref 37–47)
HGB BLD-MCNC: 13.7 G/DL (ref 12–16)
IMM GRANULOCYTES # BLD AUTO: 0.02 K/UL (ref 0–0.11)
IMM GRANULOCYTES NFR BLD AUTO: 0.3 % (ref 0–0.9)
KETONES UR STRIP.AUTO-MCNC: NEGATIVE MG/DL
LEUKOCYTE ESTERASE UR QL STRIP.AUTO: NEGATIVE
LYMPHOCYTES # BLD AUTO: 2.43 K/UL (ref 1–4.8)
LYMPHOCYTES NFR BLD: 35.9 % (ref 22–41)
MCH RBC QN AUTO: 30 PG (ref 27–33)
MCHC RBC AUTO-ENTMCNC: 32.8 G/DL (ref 33.6–35)
MCV RBC AUTO: 91.7 FL (ref 81.4–97.8)
MICRO URNS: NORMAL
MONOCYTES # BLD AUTO: 0.51 K/UL (ref 0–0.85)
MONOCYTES NFR BLD AUTO: 7.5 % (ref 0–13.4)
NEUTROPHILS # BLD AUTO: 3.49 K/UL (ref 2–7.15)
NEUTROPHILS NFR BLD: 51.6 % (ref 44–72)
NITRITE UR QL STRIP.AUTO: NEGATIVE
NRBC # BLD AUTO: 0 K/UL
NRBC BLD-RTO: 0 /100 WBC
PH UR STRIP.AUTO: 5.5 [PH] (ref 5–8)
PLATELET # BLD AUTO: 270 K/UL (ref 164–446)
PMV BLD AUTO: 10.6 FL (ref 9–12.9)
PROT UR QL STRIP: NEGATIVE MG/DL
RBC # BLD AUTO: 4.56 M/UL (ref 4.2–5.4)
RBC UR QL AUTO: NEGATIVE
SP GR UR STRIP.AUTO: 1.02
WBC # BLD AUTO: 6.8 K/UL (ref 4.8–10.8)

## 2020-12-28 PROCEDURE — 81003 URINALYSIS AUTO W/O SCOPE: CPT

## 2020-12-28 PROCEDURE — 36415 COLL VENOUS BLD VENIPUNCTURE: CPT

## 2020-12-28 PROCEDURE — 85025 COMPLETE CBC W/AUTO DIFF WBC: CPT

## 2021-01-06 ENCOUNTER — OFFICE VISIT (OUTPATIENT)
Dept: HEALTH INFORMATION MANAGEMENT | Facility: MEDICAL CENTER | Age: 66
End: 2021-01-06
Payer: COMMERCIAL

## 2021-01-06 VITALS
WEIGHT: 184.1 LBS | HEART RATE: 81 BPM | OXYGEN SATURATION: 96 % | BODY MASS INDEX: 33.88 KG/M2 | DIASTOLIC BLOOD PRESSURE: 76 MMHG | SYSTOLIC BLOOD PRESSURE: 122 MMHG | HEIGHT: 62 IN

## 2021-01-06 VITALS — HEIGHT: 62 IN | BODY MASS INDEX: 33.88 KG/M2 | WEIGHT: 184.1 LBS

## 2021-01-06 DIAGNOSIS — E78.5 DYSLIPIDEMIA: ICD-10-CM

## 2021-01-06 DIAGNOSIS — R63.5 WEIGHT GAIN: ICD-10-CM

## 2021-01-06 DIAGNOSIS — M25.561 BILATERAL CHRONIC KNEE PAIN: ICD-10-CM

## 2021-01-06 DIAGNOSIS — K21.9 GASTROESOPHAGEAL REFLUX DISEASE WITHOUT ESOPHAGITIS: ICD-10-CM

## 2021-01-06 DIAGNOSIS — E66.09 CLASS 1 OBESITY DUE TO EXCESS CALORIES WITHOUT SERIOUS COMORBIDITY WITH BODY MASS INDEX (BMI) OF 34.0 TO 34.9 IN ADULT: ICD-10-CM

## 2021-01-06 DIAGNOSIS — M25.562 BILATERAL CHRONIC KNEE PAIN: ICD-10-CM

## 2021-01-06 DIAGNOSIS — G89.29 BILATERAL CHRONIC KNEE PAIN: ICD-10-CM

## 2021-01-06 PROCEDURE — 99205 OFFICE O/P NEW HI 60 MIN: CPT | Performed by: INTERNAL MEDICINE

## 2021-01-06 PROCEDURE — 97802 MEDICAL NUTRITION INDIV IN: CPT | Performed by: DIETITIAN, REGISTERED

## 2021-01-06 ASSESSMENT — PATIENT HEALTH QUESTIONNAIRE - PHQ9: CLINICAL INTERPRETATION OF PHQ2 SCORE: 0

## 2021-01-06 ASSESSMENT — FIBROSIS 4 INDEX
FIB4 SCORE: 0.95
FIB4 SCORE: 0.95

## 2021-01-06 NOTE — PROGRESS NOTES
Bariatric Medicine H&P  Chief Complaint   Patient presents with   • Weight Gain   • Obesity       Referred by:  Isabelle Swift MD    History of Present Illness  Ana Maria Ayala is a 65 y.o. female who presents for weight management and to help address co-morbidities caused by overweight, as below.    The patient is frustrated by multiple medical issues related to chronic overweight, more recent weight gain.  She struggles with hiatal hernia, high cholesterol, chronic knee pain and was told to keep her weight down due to corrected clubfoot with difficulty ambulating.  She has intermittent foot pain.    Has tried weight watchers, Nutrisystem in the past, finds them boring and did not like Atkins diet at all.    H/o Antiobesity medications:  None  Declines use of AOM  H/o Bariatric Surgery: None    Brief Diet History (see also RD notes):  AM: Cereal, fruit and stauffer  Lunch/Dinner: Asian or burgers  Admits she eats out a lot, often two meals per day  She would be interested in meal replacements for the day as her  does not eat with her  Snacks: Only if she misses lunch  Drinks: Water or tea unsweetened    Behavior-Related History  Binge eating screen: Negative  H/o abuse: Negative     Medical Dx:  Sleep apnea screen: Negative  HLD: Controlled with statin  GERD: Controlled with daily PPI  Chronic bilateral knee pain: Control fair, not on chronic pain medications    Exercise:   Rowing or circuit training 5 days/week, weights     Review of Systems   Positive for arthritis, joint pain.  Previous significant heartburn, weakness, snoring  All other ROS were reviewed with patient today and are negative.      PMH/PSH:  I have reviewed the patient's medical, social and family history, allergies, and medications today.  Prior records reviewed.  Retired pharmacy tech, moved from [place]    Physical Exam:   /76 (BP Location: Left arm, Patient Position: Sitting, BP Cuff Size: Large adult)   Pulse 81   Ht 1.575 m (5'  "2\")   Wt 83.5 kg (184 lb 1.6 oz)   LMP  (LMP Unknown)   SpO2 96%   BMI 33.67 kg/m²   Waist Measurement   Waist: 42.5 inch/inches  Body fat % & REE:  see accompanying flow sheet    Constitutional: Oriented to person, place, and time and well-developed, well-nourished, and in no distress.    HENT: No facial plethora.  No Cushingoid features.  No scalloped tongue.  No dental erosions.  No swollen parotids.  Head: Normocephalic.   Eyes: EOM are normal. Pupils are equal, round, and reactive to light. No periorbital edema.  No lateral thinning of eyebrows.  No vertical nystagmus.  Neck: Normal range of motion. Neck supple. No thyromegaly present. No buffalo hump.  Cardiovascular: Normal rate and regular rhythm.  No murmur heard.  Pulmonary/Chest: Effort normal and breath sounds normal. No wheezes.   Abdominal: Soft. Bowel sounds are normal. No pannus.  No ascites.  No hepatosplenomegaly.   Musculoskeletal: Normal range of motion. No edema.   Neurological: Alert and oriented to person, place, and time. Normal reflexes. No cranial nerve deficit. No muscle weakness.  Gait normal.   Skin: Warm and dry. Not diaphoretic. No hirsuitism.  No acanthosis nigricans.  Not excessively dry, scaly.  No acne.  No bruising/ecchymosis.  No hyperpigmentation.  No xanthomas or acrochordon.    Psychiatric: Mood, memory, affect and judgment normal.     Laboratory:   Prior labs reviewed.  EKG: Sinus rhythm, left axis deviation, rate 66, no ST elevations or depressions.  Corrected QT 0.424  (12/9/2020)    Dietitian Assessment: I have reviewed the Dietitian's assessment related to this encounter.       ASSESSMENT  65 y.o. female presents for intensive lifestyle intervention for treatment of obesity and co-morbid conditions.  Obesity Stage ([place])/Class 1/1    1. Weight gain     2. Dyslipidemia     3. Gastroesophageal reflux disease without esophagitis     4. Class 1 obesity due to excess calories without serious comorbidity with body mass " index (BMI) of 34.0 to 34.9 in adult     5. Bilateral chronic knee pain       Recommend patient start tracking her intake, increase use of stimulus narrowing to avoid food choices and excessive processed meals out, which are very energy dense meals.  Work on better macronutrient balance as below.  Focus on reduction in knee pain, GERD, improvement in lipid profile with weight loss.  Gradually increase activity level with weight loss.  Patient currently declines use of antiobesity medication.      PLAN  Weight Goal: 150 lb  Dietary intervention:     MR: 1-3 ND daily  One if eating out with , 3/day if eating at home has been at work  High Protein/Low Carb whole food meals and 2 snacks between meals daily  >100 g protein, <100 g total carbs daily, 1200 kcal/d initial goal  Track daily intake with My Fitness Pal, bring to next visit  64+ oz water per day  Avoid excess restaurant meals  Physical Activity:  Rowing wts 5 d/wk  Labs: N/A  Diagnostics: N/A  Rx changes:   Pt declines AOM  Behavior change:   More intensive stimulus narrowing as above  Surgical considerations: N/A  Follow-up: one month with MD, weekly to biweekly for preventive obesity counseling    Face to face time spent 60 minutes,  with >50% of time devoted to one on one counseling on weight management issues, as documented above.      Patient's body mass index is 33.67 kg/m². Exercise and nutrition counseling were performed at this visit.

## 2021-01-06 NOTE — PROGRESS NOTES
"1/6/2021     Ana Maria Ayala 65 y.o.        Time in/out: 11:15-11:45am    Anthropometrics/Objective  Vitals:    01/06/21 1157   Weight: 83.5 kg (184 lb 1.6 oz)   Height: 1.575 m (5' 2\")     BMI: Body mass index is 33.67 kg/m².  Stated Goal Weight: see MD note   See comprehensive patient history form for further information     Subjective:  Pt is here today for the initial screening visit for the medical weight management program.     · She's lactose intolerant - drinks lactaid or oatmilk   · Eating patterns often based on 's schedule   · He likes to cook, but they often go out to eat   · Pt plans to include some meal replacements on the days when  is at work (lunch time)   · Prefers to keep current breakfasts and have grocery meal for dinners with    · No plan right now for weight loss meds   · She skips lunch 3-4x/wk  · May have a [employer] cold brew w/sweet cream 2x/wk     See HIP Medical Questionnaire in media for more detailed diet history     B - cold or hot cereal   L - skip  D - miso soup, 1 calamari, 1 pot sticker, sushi  S - prosciutto and provolone  Drinks: unsweetened green iced tea    Nutrition Diagnosis (PES Statement)  · Obesity related to excessive energy intake and inadequate energy expenditure as evidenced by BMI >30     Client history:  Condition(s) associated with a diagnosis or treatment / Pertinent Medical Hx: hiatal hernia, GERD, acquired hypothyroidism, dyslipidemia, weight gain, etc.    Biochemical data, medical test and procedures  No results found for: HBA1C  No results found for: POCGLUCOSE  Lab Results   Component Value Date/Time    CHOLSTRLTOT 211 (H) 10/07/2020 07:44 AM     (H) 10/07/2020 07:44 AM    HDL 65 10/07/2020 07:44 AM    TRIGLYCERIDE 84 10/07/2020 07:44 AM         Nutrition Intervention  Nutrition Prescription  Recommended Daily Kcals: 1200 Kcal based on REE of 1416   Recommended Daily Protein: 100g or ~30% of kcals      Meal Plan Recommendation "   Calorie controlled, high protein, low carb diet    with 1-2 meal replacements per day  1-2 snacks; 1 oz protein + non-starchy veggies or 1 fruit      Comprehensive Nutrition Education Instruction or training leading to in-depth nutrition related knowledge about:   Benefits to following meal plan, combine carb, protein and fat at each meal, meal timing and spacing, portion control, sweets and alcohol in moderation.  Handouts provided regarding topics discussed:   - Meal Plan   - My Plate Planner    - Snack list w/ fruit   - Meal ideas   - MyFitnessPal How-To Guide    Monitoring & Evaluation Plan    Behavioral-Environmental:  Behavior: Keep a food journal and bring to next appointment   Physical activity: Did not discuss today. Pt instructed to avoid subtracting calories from physical activity on My Fitness Pal.     Food / Nutrient Intake:  Food intake: Follow meal plan as discussed. Avoid concentrated sweets and processed carbs. Use the plate method for portion control and macronutrient balance. Limit carbs/starch to up to 1/2-1 cup per meal. Snacks should be 1oz protein + ns veggies or fruit. Fruit as a snack once per day.     Fluid intake: Consume at least 64 oz water per day. Avoid all sweetened beverages. Limit coffee to 1 cup a day (ideally no cream or sugar). Limit or avoid alcohol as discussed with Dr. Reece.     Physical Signs / Symptoms:  Weight loss towards BMI < 30   Lipid profiles trend to WNL    Assessment Notes:  Today I oriented Ana Maria to Dr. Reece's Medical Weight Management program. Encouraged a higher protein, lower carbohydrate, and calorie controlled meal plan consisting of 3 meals and 1-2 snacks per day with optional use of meal replacements. Encouraged patient to track their food/beverage intake on My Fitness Pal or utilize a written food journal.  We discussed how to build protein into each meal and snack, incorporating 1/2 plate non-starchy vegetable twice daily, optional 1/2 cup  of complex carbohydrate at meals if desired, and avoiding refined carbohydrates and simple sugars. Reviewed snack guidelines to include 1 oz protein and optional non-starchy vegetable or 1 serving of fruit.      Ana Maria will be aiming for 1200 kcal/d.  The pt will also be looking at the macronutrient feature and aiming for 100g or less of carbohydrate and 100g or more of protein per day per Dr. Reece recommendations (or 30% or less of CHO and 30% or more of protein from calories).     Ana Maria preferred to track her intake in a written food journal, which she had already started prior to today's visit. Set schedule for pt to have a meal replacement during lunch time instead of skipping. Encouraged her to include a lean protein source at breakfast if having cold/hot cereal - reviewed the carbs to be mindful of handout. Pt mostly avoids sweetened beverages. Discussed ways she could follow plate method if getting food away from home. Provided pt with list of Chio HER products - she wanted to try the bars and a couple shake samples. Next visit, recommend reviewing her food journal and discussing nutrition basics as time allows.        F/U: 4-6 weeks ANDRAE/MD

## 2021-01-21 ENCOUNTER — OFFICE VISIT (OUTPATIENT)
Dept: HEALTH INFORMATION MANAGEMENT | Facility: MEDICAL CENTER | Age: 66
End: 2021-01-21
Payer: COMMERCIAL

## 2021-01-21 VITALS — BODY MASS INDEX: 33.57 KG/M2 | HEIGHT: 62 IN | WEIGHT: 182.4 LBS

## 2021-01-21 DIAGNOSIS — E66.09 CLASS 1 OBESITY DUE TO EXCESS CALORIES WITHOUT SERIOUS COMORBIDITY WITH BODY MASS INDEX (BMI) OF 34.0 TO 34.9 IN ADULT: ICD-10-CM

## 2021-01-21 PROCEDURE — 97803 MED NUTRITION INDIV SUBSEQ: CPT | Performed by: DIETITIAN, REGISTERED

## 2021-01-21 ASSESSMENT — FIBROSIS 4 INDEX: FIB4 SCORE: 0.95

## 2021-01-21 NOTE — PROGRESS NOTES
"Nutrition Reassess: Medical Weight Management   Today's date: 1/21/2021  Referring Provider: No ref. provider found      Time: in/out 10:02-10:39 AM  Visit#: 2    Subjective:  - has been keeping a written food journal since last visit (in notebook)  - mindful of portion sizes and food choices  - including a protein with every meal  - rowing/lifting light weights 3x/ week, does PT exercises  -  likes to use the Nugo MR bars  - does like the ND MR bars, does not like the ND MR shakes  - likes to do quilting as a hobby, goes to [employer] class  - participated in a research study at [facility] following an Atkins diet, had to drop out of study d/t gout flare up; participated in the WW program in the past counting point    Diet history:   Breakfast - oatmeal, 1/2 cup blueberries, 1 cup strawberries, 2/4 cup oatmilk, 2 oz salmon  Snack - 0  Lunch - chicken caesar salad, cheese, 2 tbs dressing, no crutons  Snack - 0  Dinner - 4 oz pork chop, veggies (broccoli), 1/4 cup mashed potatoes with smaller portion of gravy  Drinks - water, green tea    Anthropometrics/Objective  Today's weight:    Vitals:    01/21/21 1054   Weight: 82.7 kg (182 lb 6.4 oz)   Height: 1.575 m (5' 2\")     BMI:  Body mass index is 33.36 kg/m².  Starting weight/date 184.1 lb (01/06/21)     Total weight change : -1.7 lb          Meal Plan:   Calorie controlled, high protein, low carb diet    with 1-2 meal replacements per day    ReAssesment/Notes:  Ana Maria has started working towards her health goals. She has found it helpful so far by keeping her written journal. We reviewed her journal together. Based on pt's diet recall from yesterday, she is having more carbs with her breakfast (fruit, oatmilk, oatmeal). We discussed other non-dairy milk alternative that would be lower in carbs but pt prefers to continue to use oatmilk for environmental/sustainability reasons. We briefly discussed ways to lower the calories when dining out such as putting sauces " on the side and following the plate method. Recommended for pt to continue keeping a journal until next visit. Suggest to review nutrition basics at next appointment.    Follow-up: 2 weeks RD/MD

## 2021-02-10 ENCOUNTER — OFFICE VISIT (OUTPATIENT)
Dept: HEALTH INFORMATION MANAGEMENT | Facility: MEDICAL CENTER | Age: 66
End: 2021-02-10
Payer: COMMERCIAL

## 2021-02-10 VITALS
HEART RATE: 80 BPM | HEIGHT: 62 IN | WEIGHT: 180.2 LBS | DIASTOLIC BLOOD PRESSURE: 78 MMHG | BODY MASS INDEX: 33.16 KG/M2 | SYSTOLIC BLOOD PRESSURE: 118 MMHG | OXYGEN SATURATION: 94 %

## 2021-02-10 DIAGNOSIS — K21.9 GASTROESOPHAGEAL REFLUX DISEASE WITHOUT ESOPHAGITIS: ICD-10-CM

## 2021-02-10 DIAGNOSIS — R63.5 WEIGHT GAIN: ICD-10-CM

## 2021-02-10 DIAGNOSIS — E66.09 CLASS 1 OBESITY DUE TO EXCESS CALORIES WITHOUT SERIOUS COMORBIDITY WITH BODY MASS INDEX (BMI) OF 34.0 TO 34.9 IN ADULT: ICD-10-CM

## 2021-02-10 DIAGNOSIS — E03.9 ACQUIRED HYPOTHYROIDISM: ICD-10-CM

## 2021-02-10 DIAGNOSIS — E78.5 DYSLIPIDEMIA: ICD-10-CM

## 2021-02-10 PROCEDURE — 97803 MED NUTRITION INDIV SUBSEQ: CPT | Performed by: DIETITIAN, REGISTERED

## 2021-02-10 PROCEDURE — 99213 OFFICE O/P EST LOW 20 MIN: CPT | Performed by: INTERNAL MEDICINE

## 2021-02-10 ASSESSMENT — PATIENT HEALTH QUESTIONNAIRE - PHQ9: CLINICAL INTERPRETATION OF PHQ2 SCORE: 0

## 2021-02-10 ASSESSMENT — FIBROSIS 4 INDEX: FIB4 SCORE: 0.95

## 2021-02-10 NOTE — PROGRESS NOTES
"Nutrition Reassess: Medical Weight Management   Today's date: 2/10/2021  Referring Provider: No ref. provider found      Time: in/out 10:37-11:14 AM  Visit#: 3    Subjective:  - keeping a written food journal  - like to dine out with  and its one of their hobbies  - has been mindful of portion sizes when dining out  - went to the gym for the first time recently, is excited to be back! - - plans to attend workout classes 2x/ week, rowing at home gym 3x/ week  - likes the Think Thin MR bars, uses those or ND MR pudding for lunches    Diet history:   Breakfast - 1 oz salami, 1/2 cup cottage cheese, 1/2 cup blueberries, 1/2 cup strawberries, water  Snack - 0  Lunch - 1/2 cam sandwich, cucumber, water  Snack - tall mocha with almond milk  Dinner - 4 oz salmon, cauliflower, 1/2 sweet potato with butter, water    Anthropometrics/Objective  Today's weight:    Vitals:    02/10/21 1025   BP: 118/78   Weight: 81.7 kg (180 lb 3.2 oz)   Height: 1.575 m (5' 2\")     BMI:  Body mass index is 32.96 kg/m².  Starting weight/date 184.1 lb (01/06/21)     Total weight change : -3.9lb             Meal Plan:   Calorie controlled, high protein, low carb diet    with 1-2 meal replacements per day    ReAssesment/Notes:  Ana Maria continues to work towards her health goals and is happy with her progress so far. We reviewed her food journal in detail. She finds that keeping a written food journal is helpful and plans to continue keeping a journal. We reviewed nutrition basics for a better understanding of carbohydrates, protein and fats. Pt plans to use the ND MR shakes/bars for lunches. She will continue to keep her food journal. Next visit suggest to review her food journal and dining out tips.      Follow-up: 1 month RD    "

## 2021-02-10 NOTE — PROGRESS NOTES
"Bariatric Medicine Follow Up  Chief Complaint   Patient presents with   • Weight Gain   • Obesity       History of Present Illness:   Ana Maria Ayala is a 65 y.o. female who presents for follow-up to intensive behavioral modification of overweight and to help address co-morbidities caused by overweight, as below.    Obesity Stage/Class: 1/1  During the patient's last visit, the following were discussed and recommended:  Weight Goal: 150 lb  Dietary intervention:     MR: 1-3 ND daily  One if eating out with , 3/day if eating at home has been at work  High Protein/Low Carb whole food meals and 2 snacks between meals daily  >100 g protein, <100 g total carbs daily, 1200 kcal/d initial goal  Track daily intake with My Fitness Pal, bring to next visit  64+ oz water per day  Avoid excess restaurant meals  Physical Activity:  Rowing wts 5 d/wk  Labs: N/A  Diagnostics: N/A  Rx changes:   Pt declines AOM  Behavior change:   More intensive stimulus narrowing as above  Surgical considerations: N/A    ___    Challenges: Few this month  Dietary adherence: Good  Tracking really helps  Cutting back on bread  Likes ND MR pudding, having 2 ND bars daily  Reducing desserts and portion sizes  Exercise:  Rowing, wts 5 d/wk    AntiObesity Medication use: None  Medication efficacy/tolerance/side effects: N/A  Medication compliance: N/A    Status of comorbid conditions/other medical diagnoses:  HLD: Controlled with daily statin  GERD: Controlled with daily PPI  Hypothyroid: Controlled with Synthroid       Review of Systems   Pt denies lightheadedness, weakness, worsening fatigue, excessive dry mouth, mood changes, paresthesias.  All other ROS were reviewed and are otherwise unchanged from my previous visit with patient.    Physical Exam:    /78 (BP Location: Left arm, Patient Position: Sitting, BP Cuff Size: Adult)   Pulse 80   Ht 1.575 m (5' 2\")   Wt 81.7 kg (180 lb 3.2 oz)   LMP  (LMP Unknown)   SpO2 94%   BMI 32.96 " kg/m²   Waist Measurement   Waist: 38 inch/inches  Weight change since last visit: -4 lb  Waist Circum change since last visit: -4.5 in     Constitutional: Oriented to person, place, and time and well-developed, well-nourished, and in no distress.    Head: Normocephalic.   Musculoskeletal: Normal range of motion. No edema.   Neurological: Alert and oriented to person, place, and time. No muscle weakness.  Gait normal.   Skin: Warm and dry. Not diaphoretic.   Psychiatric: Mood, memory, affect and judgment normal.     Laboratory:   Recent labs reviewed.      Dietitian Assessment: I have reviewed the Dietitian's assessment related to this encounter.     ASSESSMENT  65 y.o.  female presents for intensive lifestyle intervention for treatment of obesity and co-morbid conditions.  Obesity Stage ([place])/Class: 1/1    1. Class 1 obesity due to excess calories without serious comorbidity with body mass index (BMI) of 34.0 to 34.9 in adult     2. Weight gain     3. Dyslipidemia     4. Gastroesophageal reflux disease without esophagitis         Patient is doing well.  She is responding to tracking, more mindful food choices, reduction in refined CHO intake.  She is also responding well to some stimulus narrowing so recommend continuing same.  Continue increasing activity as she is doing.  Monitor lipids, GERD.    PLAN  Weight Goal: 150 lb  Dietary intervention:    MR:  1 ND pudding or bars 4 d/wk when  not home   Not eating after 6 p  High Protein/Low Carb whole food meals and 2 snacks between meals daily  >100 g protein, <100 g total carbs daily, 1200 kcal/d   Track daily intake with My Fitness Pal, bring to next visit  64+ oz water per day  Intermitten natalie chew for craving  Physical Activity:  5 d/wk  Adding more whole body workouts at gym and classes  Labs:  n/a  Rx changes:   none  Behavior modification:   Maintain higher activity level  Surgical considerations: N/A  Follow-up: 2 mo with MD, one month with  RD    Patient's body mass index is 32.96 kg/m². Exercise and nutrition counseling were performed at this visit.

## 2021-02-24 ENCOUNTER — HOSPITAL ENCOUNTER (EMERGENCY)
Facility: MEDICAL CENTER | Age: 66
End: 2021-02-24
Attending: EMERGENCY MEDICINE | Admitting: EMERGENCY MEDICINE
Payer: COMMERCIAL

## 2021-02-24 VITALS
OXYGEN SATURATION: 97 % | HEIGHT: 62 IN | HEART RATE: 74 BPM | DIASTOLIC BLOOD PRESSURE: 76 MMHG | RESPIRATION RATE: 18 BRPM | WEIGHT: 183.2 LBS | TEMPERATURE: 97.8 F | SYSTOLIC BLOOD PRESSURE: 140 MMHG | BODY MASS INDEX: 33.71 KG/M2

## 2021-02-24 DIAGNOSIS — T14.8XXA SKIN AVULSION: ICD-10-CM

## 2021-02-24 PROCEDURE — 99282 EMERGENCY DEPT VISIT SF MDM: CPT

## 2021-02-24 ASSESSMENT — FIBROSIS 4 INDEX: FIB4 SCORE: 0.95

## 2021-02-24 NOTE — ED NOTES
Discharge instructions given and discussed.Pt educated to come back to ER for new or worsening symptoms, s/s of infection,  and follow up with PCP as instructed. Pt verbalized understanding. VSS. Pt  Discharged in stable condition. Dressing was applied, pt educated, verbalized understanding.

## 2021-02-24 NOTE — ED PROVIDER NOTES
ED Provider Note    CHIEF COMPLAINT   Chief Complaint   Patient presents with   • T-5000 Lacerations     Pt was quilting  vs RT index finger  @ 1120 Sustained avulsion type lac       HPI   Ana Maria Ayala is a 65 y.o. female who presents to the ED secondary to a small skin avulsion on the right index finger.  Her tetanus is up-to-date, she has minimal pain, she could not get it to stop bleeding.  She sustained it using a .      REVIEW OF SYSTEMS   See HPI for further details.     PAST MEDICAL HISTORY   Past Medical History:   Diagnosis Date   • Allergy    • Arthritis    • Eczema    • GERD (gastroesophageal reflux disease)    • Hiatal hernia    • Hyperlipidemia    • Sinusitis    • Thyroid disease        FAMILY HISTORY  Family History   Problem Relation Age of Onset   • Cancer Mother 44        breast   • Heart Disease Mother    • Hypertension Mother    • Lung Disease Mother    • Hyperlipidemia Mother    • Lung Disease Father    • Heart Disease Maternal Grandmother    • Diabetes Neg Hx        SOCIAL HISTORY  Social History     Socioeconomic History   • Marital status:      Spouse name: Not on file   • Number of children: Not on file   • Years of education: Not on file   • Highest education level: Bachelor's degree (e.g., BA, AB, BS)   Occupational History   • Not on file   Tobacco Use   • Smoking status: Never Smoker   • Smokeless tobacco: Never Used   Substance and Sexual Activity   • Alcohol use: Not Currently     Comment: once in a bonny moon   • Drug use: Not Currently   • Sexual activity: Yes     Partners: Male   Other Topics Concern   • Not on file   Social History Narrative   • Not on file     Social Determinants of Health     Financial Resource Strain: Low Risk    • Difficulty of Paying Living Expenses: Not hard at all   Food Insecurity: No Food Insecurity   • Worried About Running Out of Food in the Last Year: Never true   • Ran Out of Food in the Last Year: Never true  "  Transportation Needs: No Transportation Needs   • Lack of Transportation (Medical): No   • Lack of Transportation (Non-Medical): No   Physical Activity: Sufficiently Active   • Days of Exercise per Week: 5 days   • Minutes of Exercise per Session: 30 min   Stress: No Stress Concern Present   • Feeling of Stress : Not at all   Social Connections: Somewhat Isolated   • Frequency of Communication with Friends and Family: Once a week   • Frequency of Social Gatherings with Friends and Family: Once a week   • Attends Orthodox Services: Never   • Active Member of Clubs or Organizations: Yes   • Attends Club or Organization Meetings: More than 4 times per year   • Marital Status:    Intimate Partner Violence:    • Fear of Current or Ex-Partner:    • Emotionally Abused:    • Physically Abused:    • Sexually Abused:        SURGICAL HISTORY  Past Surgical History:   Procedure Laterality Date   • TURBINATE REDUCTION  2014   • DENTAL SURGERY     • TONSILLECTOMY AND ADENOIDECTOMY     • TUBAL COAGULATION LAPAROSCOPIC BILATERAL         CURRENT MEDICATIONS   Home Medications    **Home medications have not yet been reviewed for this encounter**         ALLERGIES   Allergies   Allergen Reactions   • Codeine    • Hydrocodone Unspecified   • Morphine    • Septra [Sulfamethoxazole W-Trimethoprim]        PHYSICAL EXAM  VITAL SIGNS: /78   Pulse 82   Temp 35.9 °C (96.7 °F) (Temporal)   Resp 16   Ht 1.575 m (5' 2\")   Wt 83.1 kg (183 lb 3.2 oz)   LMP  (LMP Unknown)   SpO2 98%   BMI 33.51 kg/m²   Constitutional: Well developed, Well nourished, mild distress, Non-toxic appearance.   HENT:  Atraumatic, Normocephalic, Oral pharynx with moist mucous membranes.   Eyes: EOMI, PERRL  Cardiovascular: Good Pulses  Thorax & Lungs: No respiratory distress    Skin: Has a less than 1 cm skin avulsion over the radial aspect of the distal right index finger, minimal bleeding.  Musculoskeletal: No bony tenderness palpation, full range " of motion of the right index PIP DIP and MCP joint, normal sensation  Neurologic: Alert & oriented x 3,     COURSE & MEDICAL DECISION MAKING  Pertinent Labs & Imaging studies reviewed. (See chart for details)  Small skin avulsion, will put a Surgifoam dressing on it, have the patient soak it off in 2 days, have the patient return with any other concerns.  The patient's tetanus is up-to-date.        FINAL IMPRESSION  1. Skin avulsion        Patient referred to primary care provider for blood pressure management     This dictation was created using voice recognition software. The accuracy of the dictation is limited to the abilities of the software. I expect there may be some errors of grammar and possibly content. The nursing notes were reviewed and certain aspects of this information were incorporated into this note.    Electronically signed by: Chris Mcfarland M.D., 2/24/2021 1:04 PM

## 2021-03-10 ENCOUNTER — OFFICE VISIT (OUTPATIENT)
Dept: HEALTH INFORMATION MANAGEMENT | Facility: MEDICAL CENTER | Age: 66
End: 2021-03-10
Payer: COMMERCIAL

## 2021-03-10 DIAGNOSIS — E66.09 CLASS 1 OBESITY DUE TO EXCESS CALORIES WITHOUT SERIOUS COMORBIDITY WITH BODY MASS INDEX (BMI) OF 34.0 TO 34.9 IN ADULT: ICD-10-CM

## 2021-03-10 PROCEDURE — 97803 MED NUTRITION INDIV SUBSEQ: CPT | Performed by: DIETITIAN, REGISTERED

## 2021-03-11 NOTE — PROGRESS NOTES
Nutrition Reassess: Medical Weight Management   Today's date: 3/10/2021  Referring Provider: No ref. provider found      Time: in/out 10:27-11:00 AM  Visit#: 4    Subjective:  - keeping a written food journal daily  - going to the gym for exercise classes 2x/ week, using rowing machine 2-3x/ week   - using PP MR or Think Thin bar at lunch  - has noticed clothes are fitting more loose  - dining out often as it is a hobby   - has concerns about cholesterol    Diet history:   Breakfast - 1/2 egg white [place] omelet, 1 cup fruit, 6 oz almond milk mocha  Snack - 0  Lunch - vermicelli bowl with chicken, pork, shrimp and veggies, 1 tall nitro coffee with sweet cream  Snack - 0  Dinner - melvin sebastian crackers (3), 1 crab cake, 1 cup green beans, 1 pc dark chocolate    Anthropometrics/Objective  Today's weight: pt declined weight at today's visit  Starting weight/date 184.1 lb (01/06/21)      Total weight change : N/A           Meal Plan:   Calorie controlled, high protein, low carb diet    with 1-2 meal replacements per day    ReAssesment/Notes:  Ana Maria has been working towards her weight loss goals and is happy to see her clothes are fitting more loose. We reviewed her food journal together. Discussed the Dining Out tips handout briefly. Reminded pt to follow the plate method when dining out and choosing her meals. Recommended for her to continue to increase exercise as tolerated. As she has concerns about cholesterol, recommended to review fiber or the AHA fats handout but pt reports she has a good understanding about nutrition and declines to review. She plans to continue keeping her written food journal until next visit.     Follow-up: 4 months RD

## 2021-03-18 DIAGNOSIS — E78.5 DYSLIPIDEMIA: ICD-10-CM

## 2021-03-18 RX ORDER — ATORVASTATIN CALCIUM 10 MG/1
TABLET, FILM COATED ORAL
Qty: 45 TABLET | Refills: 2 | Status: SHIPPED | OUTPATIENT
Start: 2021-03-18 | End: 2021-10-05 | Stop reason: SDUPTHER

## 2021-03-18 NOTE — TELEPHONE ENCOUNTER
Received request via: Pharmacy    Was the patient seen in the last year in this department? Yes  3/10/21  Does the patient have an active prescription (recently filled or refills available) for medication(s) requested? No

## 2021-04-14 ENCOUNTER — IMMUNIZATION (OUTPATIENT)
Dept: FAMILY PLANNING/WOMEN'S HEALTH CLINIC | Facility: IMMUNIZATION CENTER | Age: 66
End: 2021-04-14
Payer: COMMERCIAL

## 2021-04-14 ENCOUNTER — OFFICE VISIT (OUTPATIENT)
Dept: HEALTH INFORMATION MANAGEMENT | Facility: MEDICAL CENTER | Age: 66
End: 2021-04-14
Payer: COMMERCIAL

## 2021-04-14 VITALS
HEIGHT: 62 IN | BODY MASS INDEX: 32.46 KG/M2 | SYSTOLIC BLOOD PRESSURE: 122 MMHG | WEIGHT: 176.4 LBS | DIASTOLIC BLOOD PRESSURE: 76 MMHG | HEART RATE: 74 BPM | OXYGEN SATURATION: 97 %

## 2021-04-14 DIAGNOSIS — K21.9 GASTROESOPHAGEAL REFLUX DISEASE WITHOUT ESOPHAGITIS: ICD-10-CM

## 2021-04-14 DIAGNOSIS — E78.5 DYSLIPIDEMIA: ICD-10-CM

## 2021-04-14 DIAGNOSIS — E66.09 CLASS 1 OBESITY DUE TO EXCESS CALORIES WITHOUT SERIOUS COMORBIDITY WITH BODY MASS INDEX (BMI) OF 34.0 TO 34.9 IN ADULT: ICD-10-CM

## 2021-04-14 DIAGNOSIS — Z23 ENCOUNTER FOR VACCINATION: Primary | ICD-10-CM

## 2021-04-14 DIAGNOSIS — R63.5 WEIGHT GAIN: ICD-10-CM

## 2021-04-14 PROCEDURE — 0001A PFIZER SARS-COV-2 VACCINE: CPT | Performed by: INTERNAL MEDICINE

## 2021-04-14 PROCEDURE — 99213 OFFICE O/P EST LOW 20 MIN: CPT | Performed by: INTERNAL MEDICINE

## 2021-04-14 PROCEDURE — 91300 PFIZER SARS-COV-2 VACCINE: CPT | Performed by: INTERNAL MEDICINE

## 2021-04-14 ASSESSMENT — PATIENT HEALTH QUESTIONNAIRE - PHQ9: CLINICAL INTERPRETATION OF PHQ2 SCORE: 0

## 2021-04-14 ASSESSMENT — FIBROSIS 4 INDEX: FIB4 SCORE: 0.95

## 2021-04-14 NOTE — PROGRESS NOTES
"Bariatric Medicine Follow Up  Chief Complaint   Patient presents with   • Weight Gain       History of Present Illness:   Ana Maria Ayala is a 65 y.o. female who presents for follow-up to intensive behavioral modification of overweight and to help address below co-morbidities caused by overweight.      Weight Management History to Date:  2/10/2021 visit #2:  1 ND MR 4 days/week when spouse not at home  Not eating after 6 PM  Tracking lower carb 1200 kcals per day  Whole body workouts 5 days/week    ___    Challenges since last visit:  few  Dietary adherence:  Overall good  Eating after exercise, working on meal timing  Not always tracking calories but overall mindful  Eating more fiber  She feels her pants are looser  Exercise:  Class 5 d/wk    AntiObesity Medication use: none  Medication efficacy/tolerance/side effects: n/a  Medication compliance: n/a    Status of comorbid conditions/other medical diagnoses:  HLD: Controlled with statin  GERD: Controlled with pantoprazole       Physical Exam:    /76 (BP Location: Left arm, Patient Position: Sitting, BP Cuff Size: Large adult)   Pulse 74   Ht 1.575 m (5' 2\")   Wt 80 kg (176 lb 6.4 oz)   LMP  (LMP Unknown)   SpO2 97%   BMI 32.26 kg/m²   Waist Measurement   Waist: 40.5 inch/inches  Weight change since last visit:  -6 lb (-7.7 lb total)  Waist Circum change since last visit:  -2 in     Physical findings unchanged from prior exam.    Laboratory:   Recent labs reviewed.     ASSESSMENT  65 y.o.  female presents for intensive lifestyle intervention for treatment of obesity and co-morbid conditions.  Obesity Stage ([place])/Class: 1/1    1. Weight gain     2. Class 1 obesity due to excess calories without serious comorbidity with body mass index (BMI) of 34.0 to 34.9 in adult     3. Dyslipidemia     4. Gastroesophageal reflux disease without esophagitis         The patient is doing well with her meal plan, continues to reverse her previous weight gain.  " Recommend continuing more mindful food choices, CHO reduction as she is doing.  Continue to monitor lipids, GERD symptoms.  Continue increasing activity as she is doing.    PLAN  Weight Goal:  150 lb  Dietary intervention:    MR: If possible, 1 ND MR daily 1 spouse away for work  High Protein/Low Carb whole food meals and 2 snacks between meals daily  Resume tracking if weight plateau  64+ oz water per day  Avoid giving meals  Physical Activity:  Workout buddies at gym x 5 d/wk and   Labs:  n/a  Rx changes:   none  Behavior modification:   Increasing activity  Surgical considerations: N/A  Follow-up: 2 month with MD  See also recent RD notes and follow-up.      Patient's body mass index is 32.26 kg/m². Exercise and nutrition counseling were performed at this visit.

## 2021-05-13 ENCOUNTER — IMMUNIZATION (OUTPATIENT)
Dept: FAMILY PLANNING/WOMEN'S HEALTH CLINIC | Facility: IMMUNIZATION CENTER | Age: 66
End: 2021-05-13
Payer: COMMERCIAL

## 2021-05-13 DIAGNOSIS — Z23 ENCOUNTER FOR VACCINATION: Primary | ICD-10-CM

## 2021-05-13 PROCEDURE — 91300 PFIZER SARS-COV-2 VACCINE: CPT | Performed by: INTERNAL MEDICINE

## 2021-05-13 PROCEDURE — 0002A PFIZER SARS-COV-2 VACCINE: CPT | Performed by: INTERNAL MEDICINE

## 2021-06-08 ENCOUNTER — OFFICE VISIT (OUTPATIENT)
Dept: HEALTH INFORMATION MANAGEMENT | Facility: MEDICAL CENTER | Age: 66
End: 2021-06-08
Payer: COMMERCIAL

## 2021-06-08 DIAGNOSIS — K21.9 GASTROESOPHAGEAL REFLUX DISEASE WITHOUT ESOPHAGITIS: ICD-10-CM

## 2021-06-08 DIAGNOSIS — E66.09 CLASS 1 OBESITY DUE TO EXCESS CALORIES WITHOUT SERIOUS COMORBIDITY WITH BODY MASS INDEX (BMI) OF 34.0 TO 34.9 IN ADULT: ICD-10-CM

## 2021-06-08 DIAGNOSIS — R63.5 WEIGHT GAIN: ICD-10-CM

## 2021-06-08 DIAGNOSIS — E78.5 DYSLIPIDEMIA: ICD-10-CM

## 2021-06-08 PROCEDURE — 97803 MED NUTRITION INDIV SUBSEQ: CPT | Performed by: DIETITIAN, REGISTERED

## 2021-06-08 NOTE — PROGRESS NOTES
Nutrition Reassess: Medical Weight Management   Today's date: 6/8/2021  Referring Provider: No ref. provider found      Time: in/out 1:25-1:40  Visit#: 5    Subjective:  - pt reports she is doing well  - pt reports she was hit a weight loss plateau  - currently not keeping a written journal  - using Think protein bars some days for MR  - eating 2-3 meals per day (will sometimes have 2 larger meals)  - [employer] classes 2x/ week, using rowing machine 2-3x/ week      Anthropometrics/Objective  Today's weight: pt declined weight at today's visit  Starting weight/date 184.1 lb (01/06/21)           Total weight change : N/A          Meal Plan:   Calorie controlled, high protein, low carb diet    with 1-2 meal replacements per day    ReAssesment/Notes: Today we reviewed Ana Maria's current habits working towards her health goals. Reminded her to follow the plate method when dining out and to be mindful of her portion sizes. We discussed including 3 meals per day. Encouraged her to continue to increase exercise as tolerated. Pt states she is doing well and does not need future RD visits at this time. Encouraged her to contact RD with any questions or concerns.     Follow-up: PRN with RD

## 2021-06-16 RX ORDER — PANTOPRAZOLE SODIUM 20 MG/1
TABLET, DELAYED RELEASE ORAL
Qty: 90 TABLET | Refills: 1 | Status: SHIPPED | OUTPATIENT
Start: 2021-06-16 | End: 2021-10-05 | Stop reason: SDUPTHER

## 2021-06-16 NOTE — TELEPHONE ENCOUNTER
Received request via: Pharmacy    Was the patient seen in the last year in this department? Yes  LOV: 6/8/2021  Does the patient have an active prescription (recently filled or refills available) for medication(s) requested? No

## 2021-06-23 ENCOUNTER — OFFICE VISIT (OUTPATIENT)
Dept: HEALTH INFORMATION MANAGEMENT | Facility: MEDICAL CENTER | Age: 66
End: 2021-06-23
Payer: COMMERCIAL

## 2021-06-23 VITALS
WEIGHT: 175 LBS | SYSTOLIC BLOOD PRESSURE: 122 MMHG | HEIGHT: 62 IN | OXYGEN SATURATION: 94 % | BODY MASS INDEX: 32.2 KG/M2 | DIASTOLIC BLOOD PRESSURE: 72 MMHG | HEART RATE: 93 BPM

## 2021-06-23 DIAGNOSIS — E78.5 DYSLIPIDEMIA: ICD-10-CM

## 2021-06-23 DIAGNOSIS — K21.9 GASTROESOPHAGEAL REFLUX DISEASE WITHOUT ESOPHAGITIS: ICD-10-CM

## 2021-06-23 DIAGNOSIS — E66.09 CLASS 1 OBESITY DUE TO EXCESS CALORIES WITHOUT SERIOUS COMORBIDITY WITH BODY MASS INDEX (BMI) OF 34.0 TO 34.9 IN ADULT: ICD-10-CM

## 2021-06-23 DIAGNOSIS — M25.561 BILATERAL CHRONIC KNEE PAIN: ICD-10-CM

## 2021-06-23 DIAGNOSIS — G89.29 BILATERAL CHRONIC KNEE PAIN: ICD-10-CM

## 2021-06-23 DIAGNOSIS — M25.562 BILATERAL CHRONIC KNEE PAIN: ICD-10-CM

## 2021-06-23 PROCEDURE — 99213 OFFICE O/P EST LOW 20 MIN: CPT | Performed by: INTERNAL MEDICINE

## 2021-06-23 ASSESSMENT — PATIENT HEALTH QUESTIONNAIRE - PHQ9: CLINICAL INTERPRETATION OF PHQ2 SCORE: 0

## 2021-06-23 ASSESSMENT — FIBROSIS 4 INDEX: FIB4 SCORE: 0.95

## 2021-06-23 NOTE — PROGRESS NOTES
"Bariatric Medicine Follow Up  Chief Complaint   Patient presents with   • Weight Gain       History of Present Illness:   Ana Maria Ayala is a 65 y.o. female who presents for follow-up to intensive behavioral modification of overweight and to help address below co-morbidities caused by overweight.      Weight Management History to Date:  4/14/2021 visit #3:  Doing well  Follows meal plan with 1 ND MR daily, lower carb meals  Workout megan at the gym 5 days/week and with       2/10/2021 visit #2:  1 ND MR 4 days/week when spouse not at home  Not eating after 6 PM  Tracking lower carb 1200 kcals per day  Whole body workouts 5 days/week  ___    Challenges since last visit:  few  Dietary adherence:  Good  She feels she has learned what to do  Usually having 2 meals and one snack now  Really watching portion sizes  Exercise:  Workout megan daily and  2 weekly, rowing machine    AntiObesity Medication use: none  Medication efficacy/tolerance/side effects: n/a  Medication compliance: n/a    Status of comorbid conditions/other medical diagnoses:  HLD: Controlled with statin  GERD: Controlled with daily PPI  Bilateral chronic knee pain: Controlled without regular pain medication       Physical Exam:    /72 (BP Location: Left arm, Patient Position: Sitting, BP Cuff Size: Large adult)   Pulse 93   Ht 1.575 m (5' 2\")   Wt 79.4 kg (175 lb)   LMP  (LMP Unknown)   SpO2 94%   BMI 32.01 kg/m²   Waist Measurement   Waist: 40 inch/inches  Weight change since last visit: -1.4 lb (-9 lb total)  Waist Circum change since last visit: -0.5 in (- 2.5 in total)    Physical findings unchanged from prior exam.    Laboratory:   Recent labs reviewed.     ASSESSMENT  65 y.o.  female presents for intensive lifestyle intervention for treatment of obesity and co-morbid conditions.  Obesity Stage ([place])/Class: 1/1    1. Dyslipidemia     2. Gastroesophageal reflux disease without esophagitis     3. Bilateral " chronic knee pain     4. Class 1 obesity due to excess calories without serious comorbidity with body mass index (BMI) of 34.0 to 34.9 in adult         Patient feels she is doing well, wants to take a break from the medical weight loss program.  Encourage patient to increase her weightbearing exercise and protein intake to avoid losing muscle mass with weight loss.  Knee pain resolved.  Other provider monitoring lipids and GERD symptoms.    PLAN  Weight Goal: 160 lb  Dietary intervention:    MR:  0  High Protein/Low Carb whole food meals and 2 snacks between meals daily  >100 g protein, <100 g total carbs daily, 1000 kcal/d, bring in tracking next visit   64+ oz water per day  Physical Activity: Trainer workouts 2 times per week, gym 5 days/week x 60 minutes each  Labs: N/A  Rx changes:    None  Behavior modification:   Maintain consistency with dietary intervention  Surgical considerations: N/A  Follow-up: prn with MD        Patient's body mass index is 32.01 kg/m². Exercise and nutrition counseling were performed at this visit.

## 2021-10-05 DIAGNOSIS — E78.5 DYSLIPIDEMIA: ICD-10-CM

## 2021-10-05 DIAGNOSIS — L30.9 ECZEMA, UNSPECIFIED TYPE: ICD-10-CM

## 2021-10-05 RX ORDER — LEVOTHYROXINE SODIUM 88 UG/1
88 TABLET ORAL
Qty: 90 TABLET | Refills: 1 | Status: SHIPPED | OUTPATIENT
Start: 2021-10-05 | End: 2021-12-15

## 2021-10-05 RX ORDER — ATORVASTATIN CALCIUM 10 MG/1
TABLET, FILM COATED ORAL
Qty: 45 TABLET | Refills: 1 | Status: SHIPPED | OUTPATIENT
Start: 2021-10-05 | End: 2022-05-02

## 2021-10-05 RX ORDER — PANTOPRAZOLE SODIUM 20 MG/1
TABLET, DELAYED RELEASE ORAL
Qty: 90 TABLET | Refills: 1 | Status: SHIPPED | OUTPATIENT
Start: 2021-10-05 | End: 2021-12-04

## 2021-10-05 RX ORDER — TRIAMCINOLONE ACETONIDE 1 MG/G
CREAM TOPICAL
Qty: 45 G | Refills: 1 | Status: SHIPPED | OUTPATIENT
Start: 2021-10-05 | End: 2022-07-13

## 2021-10-05 NOTE — TELEPHONE ENCOUNTER
----- Message from Ana Maria Aylaa sent at 10/5/2021 10:55 AM PDT -----  Regarding: Pharmacy change  Please change all my prescription requests to the [facility] at [id number] [place] [address].    Thanks

## 2021-11-23 ENCOUNTER — HOSPITAL ENCOUNTER (OUTPATIENT)
Dept: RADIOLOGY | Facility: MEDICAL CENTER | Age: 66
End: 2021-11-23
Attending: FAMILY MEDICINE
Payer: COMMERCIAL

## 2021-11-23 DIAGNOSIS — Z12.31 VISIT FOR SCREENING MAMMOGRAM: ICD-10-CM

## 2021-11-23 PROCEDURE — 77063 BREAST TOMOSYNTHESIS BI: CPT

## 2021-11-27 ENCOUNTER — HOSPITAL ENCOUNTER (OUTPATIENT)
Dept: LAB | Facility: MEDICAL CENTER | Age: 66
End: 2021-11-27
Attending: FAMILY MEDICINE
Payer: COMMERCIAL

## 2021-11-27 DIAGNOSIS — E78.5 DYSLIPIDEMIA: ICD-10-CM

## 2021-11-27 DIAGNOSIS — E66.09 CLASS 1 OBESITY DUE TO EXCESS CALORIES WITHOUT SERIOUS COMORBIDITY WITH BODY MASS INDEX (BMI) OF 34.0 TO 34.9 IN ADULT: ICD-10-CM

## 2021-11-27 DIAGNOSIS — E03.9 ACQUIRED HYPOTHYROIDISM: ICD-10-CM

## 2021-11-27 DIAGNOSIS — R77.1 ELEVATED SERUM GLOBULIN LEVEL: ICD-10-CM

## 2021-11-27 LAB
ALBUMIN SERPL BCP-MCNC: 4.4 G/DL (ref 3.2–4.9)
ALBUMIN/GLOB SERPL: 1.1 G/DL
ALP SERPL-CCNC: 102 U/L (ref 30–99)
ALT SERPL-CCNC: 29 U/L (ref 2–50)
ANION GAP SERPL CALC-SCNC: 8 MMOL/L (ref 7–16)
AST SERPL-CCNC: 28 U/L (ref 12–45)
BILIRUB SERPL-MCNC: 0.3 MG/DL (ref 0.1–1.5)
BUN SERPL-MCNC: 18 MG/DL (ref 8–22)
CALCIUM SERPL-MCNC: 9.9 MG/DL (ref 8.5–10.5)
CHLORIDE SERPL-SCNC: 101 MMOL/L (ref 96–112)
CHOLEST SERPL-MCNC: 228 MG/DL (ref 100–199)
CO2 SERPL-SCNC: 29 MMOL/L (ref 20–33)
CREAT SERPL-MCNC: 0.73 MG/DL (ref 0.5–1.4)
FASTING STATUS PATIENT QL REPORTED: NORMAL
GLOBULIN SER CALC-MCNC: 4 G/DL (ref 1.9–3.5)
GLUCOSE SERPL-MCNC: 89 MG/DL (ref 65–99)
HDLC SERPL-MCNC: 72 MG/DL
LDLC SERPL CALC-MCNC: 139 MG/DL
POTASSIUM SERPL-SCNC: 4.7 MMOL/L (ref 3.6–5.5)
PROT SERPL-MCNC: 8.4 G/DL (ref 6–8.2)
SODIUM SERPL-SCNC: 138 MMOL/L (ref 135–145)
TRIGL SERPL-MCNC: 84 MG/DL (ref 0–149)
TSH SERPL DL<=0.005 MIU/L-ACNC: 2.15 UIU/ML (ref 0.38–5.33)

## 2021-11-27 PROCEDURE — 80053 COMPREHEN METABOLIC PANEL: CPT

## 2021-11-27 PROCEDURE — 80061 LIPID PANEL: CPT

## 2021-11-27 PROCEDURE — 84443 ASSAY THYROID STIM HORMONE: CPT

## 2021-11-27 PROCEDURE — 36415 COLL VENOUS BLD VENIPUNCTURE: CPT

## 2021-12-04 ENCOUNTER — HOSPITAL ENCOUNTER (OUTPATIENT)
Facility: MEDICAL CENTER | Age: 66
End: 2021-12-04
Attending: PHYSICIAN ASSISTANT
Payer: MEDICARE

## 2021-12-04 ENCOUNTER — OFFICE VISIT (OUTPATIENT)
Dept: URGENT CARE | Facility: CLINIC | Age: 66
End: 2021-12-04
Payer: MEDICARE

## 2021-12-04 VITALS
TEMPERATURE: 98.4 F | SYSTOLIC BLOOD PRESSURE: 118 MMHG | BODY MASS INDEX: 32.2 KG/M2 | RESPIRATION RATE: 16 BRPM | DIASTOLIC BLOOD PRESSURE: 70 MMHG | HEART RATE: 79 BPM | HEIGHT: 62 IN | OXYGEN SATURATION: 96 % | WEIGHT: 175 LBS

## 2021-12-04 DIAGNOSIS — R30.0 DYSURIA: ICD-10-CM

## 2021-12-04 LAB
APPEARANCE UR: CLEAR
BILIRUB UR STRIP-MCNC: NEGATIVE MG/DL
COLOR UR AUTO: YELLOW
GLUCOSE UR STRIP.AUTO-MCNC: NEGATIVE MG/DL
KETONES UR STRIP.AUTO-MCNC: NEGATIVE MG/DL
LEUKOCYTE ESTERASE UR QL STRIP.AUTO: NEGATIVE
NITRITE UR QL STRIP.AUTO: NEGATIVE
PH UR STRIP.AUTO: 6 [PH] (ref 5–8)
PROT UR QL STRIP: NEGATIVE MG/DL
RBC UR QL AUTO: NORMAL
SP GR UR STRIP.AUTO: >=1.03
UROBILINOGEN UR STRIP-MCNC: 0.2 MG/DL

## 2021-12-04 PROCEDURE — 87086 URINE CULTURE/COLONY COUNT: CPT

## 2021-12-04 PROCEDURE — 81002 URINALYSIS NONAUTO W/O SCOPE: CPT | Performed by: PHYSICIAN ASSISTANT

## 2021-12-04 PROCEDURE — 99213 OFFICE O/P EST LOW 20 MIN: CPT | Performed by: PHYSICIAN ASSISTANT

## 2021-12-04 ASSESSMENT — ENCOUNTER SYMPTOMS
BACK PAIN: 0
VOMITING: 0
FEVER: 0
ABDOMINAL PAIN: 0
DIARRHEA: 0
HEADACHES: 0
NAUSEA: 0
CHILLS: 0
FLANK PAIN: 0

## 2021-12-04 ASSESSMENT — FIBROSIS 4 INDEX: FIB4 SCORE: 1.23

## 2021-12-04 NOTE — PROGRESS NOTES
Subjective     Ana Maria Ayala is a 65 y.o. female who presents with UTI            Dysuria   This is a new problem. Episode onset: 1 week  The problem has been waxing and waning. Quality: slight irritation in vaginal area on and off  The pain is mild. There has been no fever. There is a history of pyelonephritis. Pertinent negatives include no chills, discharge, flank pain, frequency, hematuria, nausea, urgency or vomiting. Associated symptoms comments: No vaginal discharge.. She has tried nothing for the symptoms.         Past Medical History:   Diagnosis Date   • Allergy    • Arthritis    • Eczema    • GERD (gastroesophageal reflux disease)    • Hiatal hernia    • Hyperlipidemia    • Sinusitis    • Thyroid disease        Past Surgical History:   Procedure Laterality Date   • TURBINATE REDUCTION  2014   • DENTAL SURGERY     • TONSILLECTOMY AND ADENOIDECTOMY     • TUBAL COAGULATION LAPAROSCOPIC BILATERAL         Family History   Problem Relation Age of Onset   • Cancer Mother 44        breast   • Heart Disease Mother    • Hypertension Mother    • Lung Disease Mother    • Hyperlipidemia Mother    • Lung Disease Father    • Heart Disease Maternal Grandmother    • Diabetes Neg Hx        Allergies   Allergen Reactions   • Codeine    • Hydrocodone Unspecified   • Morphine    • Septra [Sulfamethoxazole W-Trimethoprim]        Medications, Allergies, and current problem list reviewed today in Epic    Review of Systems   Constitutional: Negative for chills, fever and malaise/fatigue.   Gastrointestinal: Negative for abdominal pain, diarrhea, nausea and vomiting.   Genitourinary: Positive for dysuria. Negative for flank pain, frequency, hematuria and urgency.   Musculoskeletal: Negative for back pain.   Skin: Negative for rash.   Neurological: Negative for headaches.     All other systems reviewed and are negative.            Objective     /70 (BP Location: Right arm, Patient Position: Sitting, BP Cuff Size: Adult)    "Pulse 79   Temp 36.9 °C (98.4 °F) (Temporal)   Resp 16   Ht 1.575 m (5' 2\")   Wt 79.4 kg (175 lb)   LMP  (LMP Unknown)   SpO2 96%   BMI 32.01 kg/m²      Physical Exam  Constitutional:       General: She is not in acute distress.  HENT:      Head: Normocephalic and atraumatic.   Eyes:      Conjunctiva/sclera: Conjunctivae normal.   Cardiovascular:      Rate and Rhythm: Normal rate.   Pulmonary:      Effort: Pulmonary effort is normal. No respiratory distress.   Abdominal:      General: There is no distension.      Palpations: Abdomen is soft. There is no mass.      Tenderness: There is no right CVA tenderness, left CVA tenderness, guarding or rebound.   Skin:     General: Skin is warm and dry.   Neurological:      General: No focal deficit present.      Mental Status: She is alert and oriented to person, place, and time.   Psychiatric:         Mood and Affect: Mood normal.         Behavior: Behavior normal.         Thought Content: Thought content normal.         Judgment: Judgment normal.               Lab Results   Component Value Date/Time    POCCOLOR Yellow 12/04/2021 11:29 AM    POCAPPEAR Clear 12/04/2021 11:29 AM    POCLEUKEST Negative 12/04/2021 11:29 AM    POCNITRITE Negative 12/04/2021 11:29 AM    POCUROBILIGE 0.2 12/04/2021 11:29 AM    POCPROTEIN Negative 12/04/2021 11:29 AM    POCURPH 6.0 12/04/2021 11:29 AM    POCBLOOD Trace 12/04/2021 11:29 AM    POCSPGRV >=1.030 12/04/2021 11:29 AM    POCKETONES Negative 12/04/2021 11:29 AM    POCBILIRUBIN Negative 12/04/2021 11:29 AM    POCGLUCUA Negative 12/04/2021 11:29 AM                      Assessment & Plan        1. Dysuria  POCT Urinalysis    URINE CULTURE(NEW)     No leuks or nitrates- will culture urine and change treatment plan accordingly.   Symptoms likely a surface irritation.  Differential diagnoses, Supportive care, and indications for immediate follow-up discussed with patient.   Pathogenesis of diagnosis discussed including typical length and " natural progression.   Instructed to return to clinic or nearest emergency department for any change in condition, further concerns, or worsening of symptoms.    The patient demonstrated a good understanding and agreed with the treatment plan.    Martina Rosales P.A.-C.

## 2021-12-07 LAB
BACTERIA UR CULT: NORMAL
SIGNIFICANT IND 70042: NORMAL
SITE SITE: NORMAL
SOURCE SOURCE: NORMAL

## 2021-12-09 ENCOUNTER — OFFICE VISIT (OUTPATIENT)
Dept: MEDICAL GROUP | Facility: LAB | Age: 66
End: 2021-12-09
Payer: MEDICARE

## 2021-12-09 VITALS
DIASTOLIC BLOOD PRESSURE: 70 MMHG | SYSTOLIC BLOOD PRESSURE: 124 MMHG | RESPIRATION RATE: 14 BRPM | TEMPERATURE: 99 F | HEIGHT: 62 IN | OXYGEN SATURATION: 95 % | WEIGHT: 176 LBS | BODY MASS INDEX: 32.39 KG/M2 | HEART RATE: 84 BPM

## 2021-12-09 DIAGNOSIS — E78.5 DYSLIPIDEMIA: ICD-10-CM

## 2021-12-09 DIAGNOSIS — R77.9 ELEVATED SERUM PROTEIN LEVEL: ICD-10-CM

## 2021-12-09 DIAGNOSIS — R74.8 ELEVATED ALKALINE PHOSPHATASE LEVEL: ICD-10-CM

## 2021-12-09 DIAGNOSIS — E03.9 ACQUIRED HYPOTHYROIDISM: ICD-10-CM

## 2021-12-09 DIAGNOSIS — K21.9 GASTROESOPHAGEAL REFLUX DISEASE WITHOUT ESOPHAGITIS: ICD-10-CM

## 2021-12-09 DIAGNOSIS — Z13.0 SCREENING FOR DEFICIENCY ANEMIA: ICD-10-CM

## 2021-12-09 DIAGNOSIS — Z00.00 MEDICARE ANNUAL WELLNESS VISIT, SUBSEQUENT: ICD-10-CM

## 2021-12-09 PROBLEM — R63.5 WEIGHT GAIN: Status: RESOLVED | Noted: 2021-01-06 | Resolved: 2021-12-09

## 2021-12-09 PROCEDURE — G0402 INITIAL PREVENTIVE EXAM: HCPCS | Performed by: FAMILY MEDICINE

## 2021-12-09 ASSESSMENT — PATIENT HEALTH QUESTIONNAIRE - PHQ9: CLINICAL INTERPRETATION OF PHQ2 SCORE: 0

## 2021-12-09 ASSESSMENT — ENCOUNTER SYMPTOMS: GENERAL WELL-BEING: GOOD

## 2021-12-09 ASSESSMENT — ACTIVITIES OF DAILY LIVING (ADL): BATHING_REQUIRES_ASSISTANCE: 0

## 2021-12-09 ASSESSMENT — FIBROSIS 4 INDEX: FIB4 SCORE: 1.23

## 2021-12-09 NOTE — ASSESSMENT & PLAN NOTE
Results for RENEE KENYONN (MRN [id number]) as of 12/9/2021 07:59   Ref. Range 11/27/2021 08:25   Albumin Latest Ref Range: 3.2 - 4.9 g/dL 4.4   Total Protein Latest Ref Range: 6.0 - 8.2 g/dL 8.4 (H)   Globulin Latest Ref Range: 1.9 - 3.5 g/dL 4.0 (H)

## 2021-12-09 NOTE — ASSESSMENT & PLAN NOTE
Results for GOLDEN KENYON (MRN [id number]) as of 12/9/2021 07:59   Ref. Range 11/27/2021 08:25   Cholesterol,Tot Latest Ref Range: 100 - 199 mg/dL 228 (H)   Triglycerides Latest Ref Range: 0 - 149 mg/dL 84   HDL Latest Ref Range: >=40 mg/dL 72   LDL Latest Ref Range: <100 mg/dL 139 (H)   The 10-year ASCVD risk score (Maggiemyles ZUÑIGA Jr., et al., 2013) is: 4.5%  She is taking the atorvastatin 10 mg but she has not been watching her diet.

## 2021-12-09 NOTE — PROGRESS NOTES
Chief Complaint   Patient presents with   • Annual Exam     Medicare AWV         HPI:  Ana Maria is a 65 y.o. here for her Intial Medicare Annual Wellness Visit    Patient is doing well without complaints.    Patient Active Problem List    Diagnosis Date Noted   • Elevated serum protein level 12/09/2021   • Elevated alkaline phosphatase level 12/09/2021   • Bilateral chronic knee pain 10/14/2020   • Class 1 obesity due to excess calories without serious comorbidity with body mass index (BMI) of 34.0 to 34.9 in adult 10/14/2020   • Biceps tendonitis of both shoulders 07/16/2020   • Acquired hypothyroidism 09/19/2019   • Dyslipidemia 09/19/2019   • Hiatal hernia    • GERD (gastroesophageal reflux disease)    • Eczema        Current Outpatient Medications   Medication Sig Dispense Refill   • atorvastatin (LIPITOR) 10 MG Tab TAKE 1/2 TABLET BY MOUTH EVERY NIGHT AT BEDTIME 45 Tablet 1   • triamcinolone acetonide (KENALOG) 0.1 % Cream Apply thin film to affected areas BID until clear 45 g 1   • levothyroxine (SYNTHROID) 88 MCG Tab TAKE 1 TABLET BY MOUTH EVERY DAY 90 Tablet 3     No current facility-administered medications for this visit.        Patient is taking medications as noted in medication list.  Current supplements as per medication list.     Allergies: Codeine, Hydrocodone, Morphine, and Septra [sulfamethoxazole w-trimethoprim]    Current social contact/activities: [employer] classes      Is patient current with immunizations? Yes.    She  reports that she has never smoked. She has never used smokeless tobacco. She reports previous alcohol use. She reports previous drug use.  Counseling given: Not Answered        DPA/Advanced directive: Patient has Advanced Directive on file.     ROS:    Gait: Uses no assistive device   Ostomy: No   Other tubes: No   Amputations: No   Chronic oxygen use No   Last eye exam 2021   Wears hearing aids: No   : Denies any urinary leakage during the last 6  months      Screening:        Depression Screening    Little interest or pleasure in doing things?  0 - not at all  Feeling down, depressed, or hopeless? 0 - not at all  Patient Health Questionnaire Score: 0    If depressive symptoms identified deferred to follow up visit unless specifically addressed in assessment and plan.    Interpretation of PHQ-9 Total Score   Score Severity   1-4 No Depression   5-9 Mild Depression   10-14 Moderate Depression   15-19 Moderately Severe Depression   20-27 Severe Depression    Screening for Cognitive Impairment    Three Minute Recall (captain, garden, picture)  3/3    Brennan clock face with all 12 numbers and set the hands to show 5 past 8.  Yes    If cognitive concerns identified, deferred for follow up unless specifically addressed in assessment and plan.    Fall Risk Assessment    Has the patient had two or more falls in the last year or any fall with injury in the last year?  No  If fall risk identified, deferred for follow up unless specifically addressed in assessment and plan.    Safety Assessment    Throw rugs on floor.  Yes  Handrails on all stairs.  Yes  Good lighting in all hallways.  Yes  Difficulty hearing.  No  Patient counseled about all safety risks that were identified.    Functional Assessment ADLs    Are there any barriers preventing you from cooking for yourself or meeting nutritional needs?  No.    Are there any barriers preventing you from driving safely or obtaining transportation?  No.    Are there any barriers preventing you from using a telephone or calling for help?  No.    Are there any barriers preventing you from shopping?  No.    Are there any barriers preventing you from taking care of your own finances?  No.    Are there any barriers preventing you from managing your medications?  No.    Are there any barriers preventing you from showering, bathing or dressing yourself?  No.    Are you currently engaging in any exercise or physical activity?  Yes.   [employer] 2x weekly  What is your perception of your health?  Good.    Health Maintenance Summary          Overdue - HEPATITIS C SCREENING (Once) Overdue - never done    No completion history exists for this topic.          PAP SMEAR (Every 3 Years) Next due on 10/8/2022    10/08/2019  THINPREP PAP WITH HPV    10/08/2019  Pathology Gynecology Specimen          MAMMOGRAM (Yearly) Next due on 11/23/2022 11/23/2021  MA-SCREENING MAMMO BILAT W/TOMOSYNTHESIS W/CAD    10/19/2020  MA-SCREENING MAMMO BILAT W/TOMOSYNTHESIS W/CAD    10/03/2019  MA-SCREEN MAMMO W/CAD-BILAT          IMM DTaP/Tdap/Td Vaccine (2 - Td or Tdap) Next due on 10/8/2029    10/08/2019  Imm Admin: Tdap Vaccine    09/12/2013  Imm Admin: TD Vaccine          COLORECTAL CANCER SCREENING (COLONOSCOPY - Every 10 Years) Next due on 11/21/2029 11/21/2019  REFERRAL TO GI FOR COLONOSCOPY          IMM PNEUMOCOCCAL VACCINE: 0-64 Years (Series Information) Aged Out    09/25/2017  Imm Admin: Pneumococcal Conjugate Vaccine (Prevnar/PCV-13)          IMM ZOSTER VACCINES (Series Information) Completed    07/16/2018  Imm Admin: Zoster Vaccine Recombinant (RZV) (SHINGRIX)    04/24/2018  Imm Admin: Zoster Vaccine Recombinant (RZV) (SHINGRIX)          IMM INFLUENZA (Series Information) Completed    10/04/2021  Imm Admin: Influenza Vaccine Quad Inj (Pf)    09/28/2020  Imm Admin: Influenza Vaccine Quad Inj (Pf)    10/09/2019  Imm Admin: Influenza Vaccine Quad Inj (Pf)    09/05/2018  Imm Admin: Influenza Vaccine Quad Inj (Pf)          Annual Wellness Visit  Completed    12/09/2021  Visit Dx: Medicare annual wellness visit, subsequent    12/09/2021  Level of Service: DC INITIAL ANNUAL WELLNESS VISIT-INCLUDES PPPS          COVID-19 Vaccine (Series Information) Completed    12/12/2021  Imm Admin: Pfizer SARS-CoV-2 Vaccine 12+ 05/13/2021  Imm Admin: Pfizer SARS-CoV-2 Vaccine    04/14/2021  Imm Admin: Pfizer SARS-CoV-2 Vaccine          IMM HEP B VACCINE (Series  "Information) Aged Out    No completion history exists for this topic.          IMM MENINGOCOCCAL VACCINE (MCV4) (Series Information) Aged Out    No completion history exists for this topic.                Patient Care Team:  Isabelle Swift M.D. as PCP - General (Family Medicine)  Aba Alanis, PT as Physical Therapist (Physical Therapy)    Social History     Tobacco Use   • Smoking status: Never Smoker   • Smokeless tobacco: Never Used   Vaping Use   • Vaping Use: Never used   Substance Use Topics   • Alcohol use: Not Currently     Comment: once in a bonny moon   • Drug use: Not Currently     Family History   Problem Relation Age of Onset   • Cancer Mother 44        breast   • Heart Disease Mother    • Hypertension Mother    • Lung Disease Mother    • Hyperlipidemia Mother    • Lung Disease Father    • Heart Disease Maternal Grandmother    • Diabetes Neg Hx      She  has a past medical history of Allergy, Arthritis, Eczema, GERD (gastroesophageal reflux disease), Hiatal hernia, Hyperlipidemia, Sinusitis, and Thyroid disease.   Past Surgical History:   Procedure Laterality Date   • TURBINATE REDUCTION  2014   • DENTAL SURGERY     • TONSILLECTOMY AND ADENOIDECTOMY     • TUBAL COAGULATION LAPAROSCOPIC BILATERAL             Exam:     /70 (BP Location: Right arm, Patient Position: Sitting, BP Cuff Size: Adult)   Pulse 84   Temp 37.2 °C (99 °F)   Resp 14   Ht 1.575 m (5' 2\")   Wt 79.8 kg (176 lb)   SpO2 95%  Body mass index is 32.19 kg/m².    Hearing good.    Alert, oriented in no acute distress.  Eye contact is good, speech goal directed, affect calm  Constitutional: Alert, no distress.  Skin: Warm, dry, good turgor, no rashes in visible areas.  Eye: Equal, round and reactive, conjunctiva clear, lids normal.  ENMT: Lips without lesions, good dentition, oropharynx clear.  Neck: Trachea midline, no masses, no thyromegaly. No cervical or supraclavicular lymphadenopathy  Respiratory: Unlabored respiratory " effort, lungs clear to auscultation, no wheezes, no ronchi.  Cardiovascular: Normal S1, S2, RRR, no murmur, no edema.  Abdomen: Soft, non-tender, no masses, no hepatosplenomegaly.  Psych: Alert and oriented x3, normal affect and mood.        Assessment and Plan. The following treatment and monitoring plan is recommended:    1. Medicare annual wellness visit, subsequent  Routine anticipatory guidance.  No special services needed at this time.  Health counseling done    2. Gastroesophageal reflux disease without esophagitis  Stable.  Patient is managing with dietary modifications  Denies early satiety, unintentional weight loss, choking, persistent burning pain in chest or upper abdomen.    3. Acquired hypothyroidism  Stable. Currently taking levothyroxine 88 mcg daily as prescribed.  Denies palpitations, skin changes, temperature intolerance, or changes in bowel habits    4. Dyslipidemia   Chronic condition. Current treatments: diet/exercise/medicines. She is taking atorvastatin 10 mg daily as directed. Current side effects: none.   - Comp Metabolic Panel; Future    5. Elevated serum protein level  Check labs.  Await results  - Comp Metabolic Panel; Future    6. Elevated alkaline phosphatase level  Check labs.  Await results  - Comp Metabolic Panel; Future    7. Screening for deficiency anemia  Screening labs ordered.  Await results for counseling.    - CBC WITH DIFFERENTIAL; Future        Services suggested: No services needed at this time  Health Care Screening recommendations as per orders if indicated.  Referrals offered: PT/OT/Nutrition counseling/Behavioral Health/Smoking cessation as per orders if indicated.    Discussion today about general wellness and lifestyle habits:    · Prevent falls and reduce trip hazards; Cautioned about securing or removing rugs.  · Have a working fire alarm and carbon monoxide detector;   · Engage in regular physical activity and social activities       Follow-up: Return in about 1  year (around 12/9/2022).

## 2021-12-20 DIAGNOSIS — E78.5 DYSLIPIDEMIA: ICD-10-CM

## 2021-12-20 RX ORDER — PANTOPRAZOLE SODIUM 20 MG/1
TABLET, DELAYED RELEASE ORAL
Qty: 90 TABLET | Refills: 1 | OUTPATIENT
Start: 2021-12-20

## 2021-12-20 RX ORDER — ATORVASTATIN CALCIUM 10 MG/1
TABLET, FILM COATED ORAL
Qty: 45 TABLET | Refills: 1 | OUTPATIENT
Start: 2021-12-20

## 2021-12-20 NOTE — TELEPHONE ENCOUNTER
Received request via: Pharmacy  2 ND Request  Was the patient seen in the last year in this department? Yes  12/9/21  Does the patient have an active prescription (recently filled or refills available) for medication(s) requested? No

## 2022-01-28 ENCOUNTER — TELEPHONE (OUTPATIENT)
Dept: HEALTH INFORMATION MANAGEMENT | Facility: OTHER | Age: 67
End: 2022-01-28

## 2022-01-28 DIAGNOSIS — M81.0 POSTMENOPAUSAL BONE LOSS: ICD-10-CM

## 2022-01-28 NOTE — TELEPHONE ENCOUNTER
1. Caller Name: Ana Maria Ayala                          Call Back Number: [phone] (home)           How would the patient prefer to be contacted with a response: [email or web address] message    2. SPECIFIC Action To Be Taken: Orders pending, please sign.    3. Diagnosis/Clinical Reason for Request: DEXA    4. Specialty & Provider Name/Lab/Imaging Location: [facility]     5. Is appointment scheduled for requested order/referral: no    Patient was not informed they will receive a return phone call from the office ONLY if there are any questions before processing their request. Advised to call back if they haven't received a call from the referral department in 5 days.

## 2022-02-01 ENCOUNTER — NON-PROVIDER VISIT (OUTPATIENT)
Dept: MEDICAL GROUP | Facility: LAB | Age: 67
End: 2022-02-01
Payer: MEDICARE

## 2022-02-01 DIAGNOSIS — Z23 NEED FOR VACCINATION: ICD-10-CM

## 2022-02-01 PROCEDURE — 90732 PPSV23 VACC 2 YRS+ SUBQ/IM: CPT | Performed by: PHYSICIAN ASSISTANT

## 2022-02-01 PROCEDURE — G0009 ADMIN PNEUMOCOCCAL VACCINE: HCPCS | Performed by: PHYSICIAN ASSISTANT

## 2022-02-01 NOTE — PROGRESS NOTES
"Ana Maria Ayala is a 66 y.o. female here for a non-provider visit for:   PNEUMOVAX (PPSV23)    Reason for immunization: Overdue/Provider Recommended  Immunization records indicate need for vaccine: Yes, confirmed with Epic  Minimum interval has been met for this vaccine: Yes  ABN completed: Yes    VIS Dated   was given to patient: Yes  All IAC Questionnaire questions were answered \"No.\"    Patient tolerated injection and no adverse effects were observed or reported: Yes    Pt scheduled for next dose in series: Not Indicated   "

## 2022-02-10 ENCOUNTER — HOSPITAL ENCOUNTER (OUTPATIENT)
Dept: RADIOLOGY | Facility: MEDICAL CENTER | Age: 67
End: 2022-02-10
Attending: FAMILY MEDICINE
Payer: MEDICARE

## 2022-02-10 DIAGNOSIS — M81.0 POSTMENOPAUSAL BONE LOSS: ICD-10-CM

## 2022-02-10 PROCEDURE — 77080 DXA BONE DENSITY AXIAL: CPT

## 2022-03-03 ENCOUNTER — HOSPITAL ENCOUNTER (OUTPATIENT)
Dept: LAB | Facility: MEDICAL CENTER | Age: 67
End: 2022-03-03
Attending: FAMILY MEDICINE
Payer: MEDICARE

## 2022-03-03 DIAGNOSIS — E78.5 DYSLIPIDEMIA: ICD-10-CM

## 2022-03-03 DIAGNOSIS — R77.9 ELEVATED SERUM PROTEIN LEVEL: ICD-10-CM

## 2022-03-03 DIAGNOSIS — R74.8 ELEVATED ALKALINE PHOSPHATASE LEVEL: ICD-10-CM

## 2022-03-03 DIAGNOSIS — Z13.0 SCREENING FOR DEFICIENCY ANEMIA: ICD-10-CM

## 2022-03-03 LAB
ALBUMIN SERPL BCP-MCNC: 4.1 G/DL (ref 3.2–4.9)
ALBUMIN/GLOB SERPL: 1.1 G/DL
ALP SERPL-CCNC: 88 U/L (ref 30–99)
ALT SERPL-CCNC: 37 U/L (ref 2–50)
ANION GAP SERPL CALC-SCNC: 11 MMOL/L (ref 7–16)
AST SERPL-CCNC: 29 U/L (ref 12–45)
BASOPHILS # BLD AUTO: 0.5 % (ref 0–1.8)
BASOPHILS # BLD: 0.03 K/UL (ref 0–0.12)
BILIRUB SERPL-MCNC: 0.4 MG/DL (ref 0.1–1.5)
BUN SERPL-MCNC: 18 MG/DL (ref 8–22)
CALCIUM SERPL-MCNC: 9.9 MG/DL (ref 8.5–10.5)
CHLORIDE SERPL-SCNC: 103 MMOL/L (ref 96–112)
CO2 SERPL-SCNC: 27 MMOL/L (ref 20–33)
CREAT SERPL-MCNC: 0.66 MG/DL (ref 0.5–1.4)
EOSINOPHIL # BLD AUTO: 0.28 K/UL (ref 0–0.51)
EOSINOPHIL NFR BLD: 4.5 % (ref 0–6.9)
ERYTHROCYTE [DISTWIDTH] IN BLOOD BY AUTOMATED COUNT: 47.5 FL (ref 35.9–50)
GLOBULIN SER CALC-MCNC: 3.8 G/DL (ref 1.9–3.5)
GLUCOSE SERPL-MCNC: 87 MG/DL (ref 65–99)
HCT VFR BLD AUTO: 41.3 % (ref 37–47)
HGB BLD-MCNC: 13.6 G/DL (ref 12–16)
IMM GRANULOCYTES # BLD AUTO: 0.02 K/UL (ref 0–0.11)
IMM GRANULOCYTES NFR BLD AUTO: 0.3 % (ref 0–0.9)
LYMPHOCYTES # BLD AUTO: 2.36 K/UL (ref 1–4.8)
LYMPHOCYTES NFR BLD: 38.1 % (ref 22–41)
MCH RBC QN AUTO: 30.6 PG (ref 27–33)
MCHC RBC AUTO-ENTMCNC: 32.9 G/DL (ref 33.6–35)
MCV RBC AUTO: 92.8 FL (ref 81.4–97.8)
MONOCYTES # BLD AUTO: 0.47 K/UL (ref 0–0.85)
MONOCYTES NFR BLD AUTO: 7.6 % (ref 0–13.4)
NEUTROPHILS # BLD AUTO: 3.04 K/UL (ref 2–7.15)
NEUTROPHILS NFR BLD: 49 % (ref 44–72)
NRBC # BLD AUTO: 0 K/UL
NRBC BLD-RTO: 0 /100 WBC
PLATELET # BLD AUTO: 280 K/UL (ref 164–446)
PMV BLD AUTO: 11.6 FL (ref 9–12.9)
POTASSIUM SERPL-SCNC: 4.4 MMOL/L (ref 3.6–5.5)
PROT SERPL-MCNC: 7.9 G/DL (ref 6–8.2)
RBC # BLD AUTO: 4.45 M/UL (ref 4.2–5.4)
SODIUM SERPL-SCNC: 141 MMOL/L (ref 135–145)
WBC # BLD AUTO: 6.2 K/UL (ref 4.8–10.8)

## 2022-03-03 PROCEDURE — 36415 COLL VENOUS BLD VENIPUNCTURE: CPT

## 2022-03-03 PROCEDURE — 80053 COMPREHEN METABOLIC PANEL: CPT

## 2022-03-03 PROCEDURE — 85025 COMPLETE CBC W/AUTO DIFF WBC: CPT

## 2022-04-30 DIAGNOSIS — E78.5 DYSLIPIDEMIA: ICD-10-CM

## 2022-05-02 RX ORDER — ATORVASTATIN CALCIUM 10 MG/1
TABLET, FILM COATED ORAL
Qty: 45 TABLET | Refills: 1 | Status: SHIPPED | OUTPATIENT
Start: 2022-05-02 | End: 2022-07-13 | Stop reason: SDUPTHER

## 2022-05-02 RX ORDER — LEVOTHYROXINE SODIUM 88 UG/1
88 TABLET ORAL
Qty: 90 TABLET | Refills: 1 | Status: SHIPPED | OUTPATIENT
Start: 2022-05-02 | End: 2022-07-13 | Stop reason: SDUPTHER

## 2022-07-13 ENCOUNTER — OFFICE VISIT (OUTPATIENT)
Dept: MEDICAL GROUP | Facility: LAB | Age: 67
End: 2022-07-13
Payer: MEDICARE

## 2022-07-13 VITALS
OXYGEN SATURATION: 96 % | DIASTOLIC BLOOD PRESSURE: 72 MMHG | WEIGHT: 171 LBS | SYSTOLIC BLOOD PRESSURE: 118 MMHG | HEART RATE: 103 BPM | RESPIRATION RATE: 16 BRPM | TEMPERATURE: 97.5 F | BODY MASS INDEX: 31.47 KG/M2 | HEIGHT: 62 IN

## 2022-07-13 DIAGNOSIS — E03.9 ACQUIRED HYPOTHYROIDISM: ICD-10-CM

## 2022-07-13 DIAGNOSIS — E78.5 DYSLIPIDEMIA: ICD-10-CM

## 2022-07-13 DIAGNOSIS — M87.876 MULLER-WEISS DISEASE (HCC): ICD-10-CM

## 2022-07-13 DIAGNOSIS — L08.9 SKIN INFECTION: ICD-10-CM

## 2022-07-13 PROCEDURE — 99214 OFFICE O/P EST MOD 30 MIN: CPT | Performed by: PHYSICIAN ASSISTANT

## 2022-07-13 RX ORDER — ATORVASTATIN CALCIUM 10 MG/1
5 TABLET, FILM COATED ORAL
Qty: 45 TABLET | Refills: 1 | Status: SHIPPED | OUTPATIENT
Start: 2022-07-13 | End: 2022-07-15

## 2022-07-13 RX ORDER — LEVOTHYROXINE SODIUM 88 UG/1
88 TABLET ORAL
Qty: 90 TABLET | Refills: 1 | Status: SHIPPED | OUTPATIENT
Start: 2022-07-13

## 2022-07-13 ASSESSMENT — FIBROSIS 4 INDEX: FIB4 SCORE: 1.11

## 2022-07-13 ASSESSMENT — PATIENT HEALTH QUESTIONNAIRE - PHQ9: CLINICAL INTERPRETATION OF PHQ2 SCORE: 0

## 2022-07-13 NOTE — PROGRESS NOTES
"Subjective:   CC: Ana Maria Ayala is a 66 y.o. female here today for continue to monitor symptoms, follow up.  HPI:  Stokes-Mcclain disease (HCC)  New to me; diagnosed by [facility] this year due to chronic mid foot pain.     Dyslipidemia  Follow-up, she is currently taking 5 mg of atorvastatin.  Due to have updated cholesterol levels.  She is open to changing medication pending results of tests given that it is very difficult to cut atorvastatin 10 mg in half.       Current medicines (including changes today)  Current Outpatient Medications   Medication Sig Dispense Refill   • atorvastatin (LIPITOR) 10 MG Tab Take 0.5 Tablets by mouth at bedtime. 45 Tablet 1   • levothyroxine (SYNTHROID) 88 MCG Tab Take 1 Tablet by mouth every day. 90 Tablet 1   • mupirocin (BACTROBAN) 2 % Ointment Apply 1 Application topically 2 times a day. x7 days 22 g 0     No current facility-administered medications for this visit.     She  has a past medical history of Allergy, Arthritis, Eczema, GERD (gastroesophageal reflux disease), Hiatal hernia, Hyperlipidemia, Sinusitis, and Thyroid disease.    ROS   No chest pain, no shortness of breath, no abdominal pain       Objective:     /72 (BP Location: Left arm, Patient Position: Sitting, BP Cuff Size: Large adult)   Pulse (!) 103   Temp 36.4 °C (97.5 °F) (Temporal)   Resp 16   Ht 1.575 m (5' 2\")   Wt 77.6 kg (171 lb)   SpO2 96%  Body mass index is 31.28 kg/m².   Physical Exam:  Constitutional: Alert, no distress.  Skin: Warm, dry, good turgor, no rashes in visible areas.  Eye: Equal, round, conjunctiva clear, lids normal.  Neck: Trachea midline, no masses  Respiratory: Unlabored respiratory effort, lungs clear to auscultation, no wheezes, no ronchi.  Cardiovascular: Normal S1, S2, no murmur, no edema.  Psych: Alert and oriented x3, normal affect and mood.    Assessment and Plan:   The following treatment plan was discussed    1. Dyslipidemia  New to me, chronic condition.  Due for " updated lipid panel.  Discussed that we may adjust medication, could consider using simvastatin or lovastatin as she prefers not to be on atorvastatin 10 mg.  - atorvastatin (LIPITOR) 10 MG Tab; Take 0.5 Tablets by mouth at bedtime.  Dispense: 45 Tablet; Refill: 1  - Lipid Profile; Future    2. Acquired hypothyroidism  New to me, chronic and stable.  Due for refills.  We will recheck thyroid levels  - levothyroxine (SYNTHROID) 88 MCG Tab; Take 1 Tablet by mouth every day.  Dispense: 90 Tablet; Refill: 1  - TSH; Future  - FREE THYROXINE; Future  - T3 FREE; Future    3. Stokes-Mcclain disease (HCC)  New to me, chronic and stable.  History of clubfoot as a child, had this surgically repaired.  Was seen by [facility] earlier this year.  Patient to continue to monitor symptoms, follow-up with orthopedics as needed    4. Skin infection  New to me, this was brought to my attention at the very end of the visit.  Reports that she has a recurrent draining cyst or abscess in the groin/pelvic region.  No fevers or chills.  We will do a trial of mupirocin as written.  Pt was educated on use and SEs of medication.  Strong recommendation to see dermatology if no improvement with topical preparation.  She agrees with the plan  - mupirocin (BACTROBAN) 2 % Ointment; Apply 1 Application topically 2 times a day. x7 days  Dispense: 22 g; Refill: 0      Followup: Return if symptoms worsen or fail to improve, for [place] of [place] in [place].       Joanna Turcios PTRACY.-SILVIA.  Supervising MD: Dr. Sina Lyn MD  07/13/22

## 2022-07-13 NOTE — ASSESSMENT & PLAN NOTE
Follow-up, she is currently taking 5 mg of atorvastatin.  Due to have updated cholesterol levels.  She is open to changing medication pending results of tests given that it is very difficult to cut atorvastatin 10 mg in half.

## 2022-07-14 ENCOUNTER — HOSPITAL ENCOUNTER (OUTPATIENT)
Dept: LAB | Facility: MEDICAL CENTER | Age: 67
End: 2022-07-14
Attending: PHYSICIAN ASSISTANT
Payer: MEDICARE

## 2022-07-14 DIAGNOSIS — E03.9 ACQUIRED HYPOTHYROIDISM: ICD-10-CM

## 2022-07-14 DIAGNOSIS — E78.5 DYSLIPIDEMIA: ICD-10-CM

## 2022-07-14 LAB
CHOLEST SERPL-MCNC: 188 MG/DL (ref 100–199)
FASTING STATUS PATIENT QL REPORTED: NORMAL
HDLC SERPL-MCNC: 61 MG/DL
LDLC SERPL CALC-MCNC: 116 MG/DL
T3FREE SERPL-MCNC: 2.73 PG/ML (ref 2–4.4)
T4 FREE SERPL-MCNC: 1.82 NG/DL (ref 0.93–1.7)
TRIGL SERPL-MCNC: 57 MG/DL (ref 0–149)
TSH SERPL DL<=0.005 MIU/L-ACNC: 1.34 UIU/ML (ref 0.38–5.33)

## 2022-07-14 PROCEDURE — 84481 FREE ASSAY (FT-3): CPT

## 2022-07-14 PROCEDURE — 84439 ASSAY OF FREE THYROXINE: CPT

## 2022-07-14 PROCEDURE — 36415 COLL VENOUS BLD VENIPUNCTURE: CPT

## 2022-07-14 PROCEDURE — 84443 ASSAY THYROID STIM HORMONE: CPT

## 2022-07-14 PROCEDURE — 80061 LIPID PANEL: CPT

## 2022-07-15 ENCOUNTER — PATIENT MESSAGE (OUTPATIENT)
Dept: MEDICAL GROUP | Facility: LAB | Age: 67
End: 2022-07-15
Payer: COMMERCIAL

## 2022-07-15 DIAGNOSIS — E78.5 DYSLIPIDEMIA: ICD-10-CM

## 2022-07-15 RX ORDER — SIMVASTATIN 5 MG
5 TABLET ORAL NIGHTLY
Qty: 90 TABLET | Refills: 1 | Status: SHIPPED | OUTPATIENT
Start: 2022-07-15

## 2022-11-11 ENCOUNTER — PATIENT MESSAGE (OUTPATIENT)
Dept: HEALTH INFORMATION MANAGEMENT | Facility: OTHER | Age: 67
End: 2022-11-11